# Patient Record
Sex: FEMALE | Race: WHITE | NOT HISPANIC OR LATINO | Employment: OTHER | URBAN - METROPOLITAN AREA
[De-identification: names, ages, dates, MRNs, and addresses within clinical notes are randomized per-mention and may not be internally consistent; named-entity substitution may affect disease eponyms.]

---

## 2023-04-17 LAB
ALANINE AMINOTRANSFERASE (SGPT) (U/L) IN SER/PLAS: 7 U/L (ref 7–45)
ALBUMIN (G/DL) IN SER/PLAS: 4.6 G/DL (ref 3.4–5)
ALKALINE PHOSPHATASE (U/L) IN SER/PLAS: 61 U/L (ref 33–136)
ANION GAP IN SER/PLAS: 12 MMOL/L (ref 10–20)
ASPARTATE AMINOTRANSFERASE (SGOT) (U/L) IN SER/PLAS: 13 U/L (ref 9–39)
BILIRUBIN TOTAL (MG/DL) IN SER/PLAS: 0.7 MG/DL (ref 0–1.2)
CALCIUM (MG/DL) IN SER/PLAS: 9.6 MG/DL (ref 8.6–10.6)
CARBON DIOXIDE, TOTAL (MMOL/L) IN SER/PLAS: 30 MMOL/L (ref 21–32)
CHLORIDE (MMOL/L) IN SER/PLAS: 104 MMOL/L (ref 98–107)
COBALAMIN (VITAMIN B12) (PG/ML) IN SER/PLAS: 527 PG/ML (ref 211–911)
CREATININE (MG/DL) IN SER/PLAS: 0.84 MG/DL (ref 0.5–1.05)
ERYTHROCYTE DISTRIBUTION WIDTH (RATIO) BY AUTOMATED COUNT: 12.9 % (ref 11.5–14.5)
ERYTHROCYTE MEAN CORPUSCULAR HEMOGLOBIN CONCENTRATION (G/DL) BY AUTOMATED: 32 G/DL (ref 32–36)
ERYTHROCYTE MEAN CORPUSCULAR VOLUME (FL) BY AUTOMATED COUNT: 92 FL (ref 80–100)
ERYTHROCYTES (10*6/UL) IN BLOOD BY AUTOMATED COUNT: 4.79 X10E12/L (ref 4–5.2)
GFR FEMALE: 74 ML/MIN/1.73M2
GLUCOSE (MG/DL) IN SER/PLAS: 97 MG/DL (ref 74–99)
HEMATOCRIT (%) IN BLOOD BY AUTOMATED COUNT: 44.1 % (ref 36–46)
HEMOGLOBIN (G/DL) IN BLOOD: 14.1 G/DL (ref 12–16)
LEUKOCYTES (10*3/UL) IN BLOOD BY AUTOMATED COUNT: 4.7 X10E9/L (ref 4.4–11.3)
NRBC (PER 100 WBCS) BY AUTOMATED COUNT: 0 /100 WBC (ref 0–0)
PLATELETS (10*3/UL) IN BLOOD AUTOMATED COUNT: 230 X10E9/L (ref 150–450)
POTASSIUM (MMOL/L) IN SER/PLAS: 4 MMOL/L (ref 3.5–5.3)
PROTEIN TOTAL: 6.9 G/DL (ref 6.4–8.2)
SODIUM (MMOL/L) IN SER/PLAS: 142 MMOL/L (ref 136–145)
THYROTROPIN (MIU/L) IN SER/PLAS BY DETECTION LIMIT <= 0.05 MIU/L: 2.34 MIU/L (ref 0.44–3.98)
UREA NITROGEN (MG/DL) IN SER/PLAS: 17 MG/DL (ref 6–23)

## 2023-10-18 ENCOUNTER — LAB (OUTPATIENT)
Dept: LAB | Facility: LAB | Age: 73
End: 2023-10-18
Payer: MEDICARE

## 2023-10-18 DIAGNOSIS — I10 ESSENTIAL (PRIMARY) HYPERTENSION: Primary | ICD-10-CM

## 2023-10-18 LAB
ANION GAP SERPL CALC-SCNC: 15 MMOL/L (ref 10–20)
BUN SERPL-MCNC: 22 MG/DL (ref 6–23)
CALCIUM SERPL-MCNC: 9.6 MG/DL (ref 8.6–10.6)
CHLORIDE SERPL-SCNC: 104 MMOL/L (ref 98–107)
CO2 SERPL-SCNC: 27 MMOL/L (ref 21–32)
CREAT SERPL-MCNC: 0.89 MG/DL (ref 0.5–1.05)
ERYTHROCYTE [DISTWIDTH] IN BLOOD BY AUTOMATED COUNT: 13.1 % (ref 11.5–14.5)
GFR SERPL CREATININE-BSD FRML MDRD: 69 ML/MIN/1.73M*2
GLUCOSE SERPL-MCNC: 102 MG/DL (ref 74–99)
HCT VFR BLD AUTO: 42.7 % (ref 36–46)
HGB BLD-MCNC: 13.5 G/DL (ref 12–16)
MCH RBC QN AUTO: 30.5 PG (ref 26–34)
MCHC RBC AUTO-ENTMCNC: 31.6 G/DL (ref 32–36)
MCV RBC AUTO: 97 FL (ref 80–100)
NRBC BLD-RTO: 0 /100 WBCS (ref 0–0)
PLATELET # BLD AUTO: 209 X10*3/UL (ref 150–450)
PMV BLD AUTO: 10.8 FL (ref 7.5–11.5)
POTASSIUM SERPL-SCNC: 4.2 MMOL/L (ref 3.5–5.3)
RBC # BLD AUTO: 4.42 X10*6/UL (ref 4–5.2)
SODIUM SERPL-SCNC: 142 MMOL/L (ref 136–145)
WBC # BLD AUTO: 4.4 X10*3/UL (ref 4.4–11.3)

## 2023-10-18 PROCEDURE — 36415 COLL VENOUS BLD VENIPUNCTURE: CPT

## 2023-10-18 PROCEDURE — 80048 BASIC METABOLIC PNL TOTAL CA: CPT

## 2023-10-18 PROCEDURE — 85027 COMPLETE CBC AUTOMATED: CPT

## 2023-11-01 PROBLEM — F32.A DEPRESSION: Status: ACTIVE | Noted: 2023-11-01

## 2023-11-01 PROBLEM — C50.811 MALIGNANT NEOPLASM OF OVERLAPPING SITES OF RIGHT FEMALE BREAST (MULTI): Status: ACTIVE | Noted: 2022-12-22

## 2023-11-01 PROBLEM — N60.19 FIBROCYSTIC CHANGE OF BREAST: Status: ACTIVE | Noted: 2023-11-01

## 2023-11-01 PROBLEM — D72.819 LEUKOPENIA: Status: ACTIVE | Noted: 2023-11-01

## 2023-11-01 PROBLEM — J34.89 RHINORRHEA: Status: ACTIVE | Noted: 2023-11-01

## 2023-11-01 PROBLEM — R01.1 MURMUR: Status: ACTIVE | Noted: 2023-11-01

## 2023-11-01 PROBLEM — I10 HIGH BLOOD PRESSURE: Status: ACTIVE | Noted: 2023-11-01

## 2023-11-01 RX ORDER — ACETAMINOPHEN, DEXTROMETHORPHAN HBR, DOXYLAMINE SUCCINATE, PHENYLEPHRINE HCL 650; 20; 12.5; 1 MG/30ML; MG/30ML; MG/30ML; MG/30ML
1 SOLUTION ORAL DAILY
COMMUNITY
Start: 2013-10-08

## 2023-11-01 RX ORDER — BENZONATATE 100 MG/1
1 CAPSULE ORAL 3 TIMES DAILY PRN
COMMUNITY
Start: 2022-03-16 | End: 2023-11-28 | Stop reason: DRUGHIGH

## 2023-11-01 RX ORDER — CITALOPRAM 20 MG/1
1 TABLET, FILM COATED ORAL DAILY
COMMUNITY
Start: 2014-04-25 | End: 2023-11-02 | Stop reason: SDUPTHER

## 2023-11-01 RX ORDER — METOPROLOL SUCCINATE 100 MG/1
1 TABLET, EXTENDED RELEASE ORAL DAILY
COMMUNITY
Start: 2013-05-31 | End: 2024-05-02 | Stop reason: SDUPTHER

## 2023-11-01 RX ORDER — AMLODIPINE BESYLATE 5 MG/1
1 TABLET ORAL DAILY
COMMUNITY
End: 2023-11-02 | Stop reason: SDUPTHER

## 2023-11-01 RX ORDER — IPRATROPIUM BROMIDE 42 UG/1
2 SPRAY, METERED NASAL 3 TIMES DAILY
COMMUNITY
Start: 2022-05-10 | End: 2023-11-28 | Stop reason: WASHOUT

## 2023-11-01 RX ORDER — ERGOCALCIFEROL 1.25 MG/1
1 CAPSULE ORAL 2 TIMES WEEKLY
COMMUNITY
Start: 2013-02-19 | End: 2023-11-28 | Stop reason: WASHOUT

## 2023-11-01 RX ORDER — LEVOTHYROXINE SODIUM 88 UG/1
1 TABLET ORAL DAILY
COMMUNITY
Start: 2013-05-31 | End: 2024-05-02 | Stop reason: SDUPTHER

## 2023-11-01 RX ORDER — ROSUVASTATIN CALCIUM 10 MG/1
1 TABLET, FILM COATED ORAL DAILY
COMMUNITY
Start: 2013-05-31 | End: 2023-11-02 | Stop reason: SDUPTHER

## 2023-11-02 ENCOUNTER — OFFICE VISIT (OUTPATIENT)
Dept: PRIMARY CARE | Facility: CLINIC | Age: 73
End: 2023-11-02
Payer: MEDICARE

## 2023-11-02 VITALS
TEMPERATURE: 97.9 F | SYSTOLIC BLOOD PRESSURE: 128 MMHG | BODY MASS INDEX: 24.49 KG/M2 | HEART RATE: 59 BPM | WEIGHT: 147 LBS | DIASTOLIC BLOOD PRESSURE: 78 MMHG | OXYGEN SATURATION: 99 % | HEIGHT: 65 IN

## 2023-11-02 DIAGNOSIS — I10 HYPERTENSION, UNSPECIFIED TYPE: ICD-10-CM

## 2023-11-02 DIAGNOSIS — E03.9 HYPOTHYROIDISM, UNSPECIFIED TYPE: ICD-10-CM

## 2023-11-02 DIAGNOSIS — E78.5 HYPERLIPIDEMIA, UNSPECIFIED HYPERLIPIDEMIA TYPE: ICD-10-CM

## 2023-11-02 DIAGNOSIS — F32.A DEPRESSION, UNSPECIFIED DEPRESSION TYPE: Primary | ICD-10-CM

## 2023-11-02 DIAGNOSIS — E55.9 VITAMIN D DEFICIENCY: ICD-10-CM

## 2023-11-02 DIAGNOSIS — E53.8 VITAMIN B12 DEFICIENCY: ICD-10-CM

## 2023-11-02 DIAGNOSIS — F41.1 ANXIETY STATE: ICD-10-CM

## 2023-11-02 PROBLEM — C50.811 MALIGNANT NEOPLASM OF OVERLAPPING SITES OF RIGHT FEMALE BREAST (MULTI): Status: RESOLVED | Noted: 2022-12-22 | Resolved: 2023-11-02

## 2023-11-02 PROBLEM — R01.1 MURMUR: Status: RESOLVED | Noted: 2023-11-01 | Resolved: 2023-11-02

## 2023-11-02 PROBLEM — J34.89 RHINORRHEA: Status: RESOLVED | Noted: 2023-11-01 | Resolved: 2023-11-02

## 2023-11-02 PROCEDURE — 3074F SYST BP LT 130 MM HG: CPT | Performed by: INTERNAL MEDICINE

## 2023-11-02 PROCEDURE — 3078F DIAST BP <80 MM HG: CPT | Performed by: INTERNAL MEDICINE

## 2023-11-02 PROCEDURE — 1126F AMNT PAIN NOTED NONE PRSNT: CPT | Performed by: INTERNAL MEDICINE

## 2023-11-02 PROCEDURE — 1159F MED LIST DOCD IN RCRD: CPT | Performed by: INTERNAL MEDICINE

## 2023-11-02 PROCEDURE — 99214 OFFICE O/P EST MOD 30 MIN: CPT | Performed by: INTERNAL MEDICINE

## 2023-11-02 PROCEDURE — 1036F TOBACCO NON-USER: CPT | Performed by: INTERNAL MEDICINE

## 2023-11-02 RX ORDER — AMLODIPINE BESYLATE 5 MG/1
5 TABLET ORAL DAILY
Qty: 90 TABLET | Refills: 3 | Status: SHIPPED | OUTPATIENT
Start: 2023-11-02

## 2023-11-02 RX ORDER — CITALOPRAM 20 MG/1
20 TABLET, FILM COATED ORAL DAILY
Qty: 90 TABLET | Refills: 3 | Status: SHIPPED | OUTPATIENT
Start: 2023-11-02

## 2023-11-02 RX ORDER — ROSUVASTATIN CALCIUM 10 MG/1
10 TABLET, FILM COATED ORAL DAILY
Qty: 90 TABLET | Refills: 3 | Status: SHIPPED | OUTPATIENT
Start: 2023-11-02 | End: 2023-11-03

## 2023-11-02 ASSESSMENT — PATIENT HEALTH QUESTIONNAIRE - PHQ9
SUM OF ALL RESPONSES TO PHQ9 QUESTIONS 1 & 2: 0
1. LITTLE INTEREST OR PLEASURE IN DOING THINGS: NOT AT ALL
2. FEELING DOWN, DEPRESSED OR HOPELESS: NOT AT ALL

## 2023-11-02 ASSESSMENT — PAIN SCALES - GENERAL: PAINLEVEL: 0-NO PAIN

## 2023-11-02 NOTE — PROGRESS NOTES
"Subjective   Patient ID: Anna Marie Bryant is a 73 y.o. female who presents for Follow-up (Pt here for 6 mo FUV.  Pt needs refills.).  Last 5/2023.   Patient denies chest pain, pressure, palpitations, or shortness of breath  No GI or  complaints     Past Medical History  Right breast cancer 1985 age 35 lumpectomy and radiation treatment.   Breast cancer diagnosed 2013, right mastectomy,  [facility].   Hypothyroidism TSH April 2023 normal  Hyperlipidemia  HTN  Anxiety  B12 deficiency  Remote pneumonia  Leukopenia, Dr Antunez (patient indicates stable and did not need follow up)  Oophorectomy 1976, ovarian cyst ruptured.      Social History  Former smoker quit age 31  . no children    Objective   /78 (BP Location: Left arm, Patient Position: Sitting)   Pulse 59   Temp 36.6 °C (97.9 °F)   Ht 1.651 m (5' 5\")   Wt 66.7 kg (147 lb)   SpO2 99%   BMI 24.46 kg/m²       Vital reviewed  Neck: no cervical/clavicular adenopathy  CV: RRR S1 S2 normal. No murmur  Lungs: CTA without wrr. Breath sounds symmetric  Abdomen: normoactive. Soft, nontender. No mass.  Extremities: no pretibial edema  Neuro: speech intact.   Skin no rash noted    Labs 10/203 bmp, cbc glucose 102 hg 13.5    4/2023 cbc, tsh, cmp, b12      Diagnoses and all orders for this visit:  Depression, unspecified depression type  -     citalopram (CeleXA) 20 mg tablet; Take 1 tablet (20 mg) by mouth once daily.  Anxiety state  -     citalopram (CeleXA) 20 mg tablet; Take 1 tablet (20 mg) by mouth once daily.  Hyperlipidemia, unspecified hyperlipidemia type  -     Crestor 10 mg tablet; Take 1 tablet (10 mg) by mouth once daily.  -     Comprehensive Metabolic Panel; Future  -     Lipid Panel; Future  Hypertension, unspecified type  -     amLODIPine (Norvasc) 5 mg tablet; Take 1 tablet (5 mg) by mouth once daily. as directed  -     CBC; Future  Hypothyroidism, unspecified type  -     TSH with reflex to Free T4 if abnormal; Future  Vitamin B12 " deficiency  -     Vitamin B12; Future  Vitamin D deficiency  -     Vitamin D 25-Hydroxy,Total (for eval of Vitamin D levels); Future     Mammogram 12/2022 s/p right mastectomy  Dexa 9/2022  Colonoscopy due 2027  Immunizations reviewed.

## 2023-11-03 ENCOUNTER — TELEPHONE (OUTPATIENT)
Dept: PRIMARY CARE | Facility: CLINIC | Age: 73
End: 2023-11-03
Payer: MEDICARE

## 2023-11-03 DIAGNOSIS — E78.5 HYPERLIPIDEMIA, UNSPECIFIED HYPERLIPIDEMIA TYPE: Primary | ICD-10-CM

## 2023-11-03 RX ORDER — ROSUVASTATIN CALCIUM 40 MG/1
40 TABLET, COATED ORAL DAILY
Qty: 90 TABLET | Refills: 3 | Status: SHIPPED | OUTPATIENT
Start: 2023-11-03 | End: 2023-11-07 | Stop reason: ENTERED-IN-ERROR

## 2023-11-03 NOTE — TELEPHONE ENCOUNTER
"Pt called and left message requesting Crestor to be resent as Rosuvastatin.  States \"There is almost a $600 difference so I would like to generic.\"  [facility]  "

## 2023-11-07 ENCOUNTER — TELEPHONE (OUTPATIENT)
Dept: PRIMARY CARE | Facility: CLINIC | Age: 73
End: 2023-11-07
Payer: MEDICARE

## 2023-11-07 DIAGNOSIS — E78.5 HYPERLIPIDEMIA, UNSPECIFIED HYPERLIPIDEMIA TYPE: Primary | ICD-10-CM

## 2023-11-07 RX ORDER — ROSUVASTATIN CALCIUM 10 MG/1
10 TABLET, COATED ORAL DAILY
Qty: 90 TABLET | Refills: 3 | Status: SHIPPED | OUTPATIENT
Start: 2023-11-07

## 2023-11-07 NOTE — TELEPHONE ENCOUNTER
"Pt called and left message \"I have two things today.  The first one, rosuvastatin 40 MG was sent to the pharmacy, but I take 10 MG.  I would like the Rosuvastatin 10 MG sent to [facility].  Also, I am having some laser dental work done and they want me to take an antibiotic prior.  They want me to take a z-pack because I am allergic to Amoxicillin, but the pharmacist would not fill that because he said that I have an allergy to Azithromycin.  Please advise me on what I can take.\"  "

## 2023-11-08 NOTE — TELEPHONE ENCOUNTER
"Called pt and informed, voiced understanding.  Pt states \"I have had breathing problems when I take the Erythromycin, so I will tell the dentist I cannot take that and that I have taken Doxycycline in the past.\"  "

## 2023-11-28 ENCOUNTER — OFFICE VISIT (OUTPATIENT)
Dept: PRIMARY CARE | Facility: CLINIC | Age: 73
End: 2023-11-28
Payer: MEDICARE

## 2023-11-28 VITALS
OXYGEN SATURATION: 96 % | HEART RATE: 83 BPM | BODY MASS INDEX: 24.49 KG/M2 | HEIGHT: 65 IN | WEIGHT: 147 LBS | TEMPERATURE: 98.1 F | DIASTOLIC BLOOD PRESSURE: 82 MMHG | SYSTOLIC BLOOD PRESSURE: 122 MMHG

## 2023-11-28 DIAGNOSIS — R06.2 WHEEZING: ICD-10-CM

## 2023-11-28 DIAGNOSIS — J40 BRONCHITIS: Primary | ICD-10-CM

## 2023-11-28 PROCEDURE — 3079F DIAST BP 80-89 MM HG: CPT | Performed by: INTERNAL MEDICINE

## 2023-11-28 PROCEDURE — 3074F SYST BP LT 130 MM HG: CPT | Performed by: INTERNAL MEDICINE

## 2023-11-28 PROCEDURE — 1160F RVW MEDS BY RX/DR IN RCRD: CPT | Performed by: INTERNAL MEDICINE

## 2023-11-28 PROCEDURE — 1159F MED LIST DOCD IN RCRD: CPT | Performed by: INTERNAL MEDICINE

## 2023-11-28 PROCEDURE — 1126F AMNT PAIN NOTED NONE PRSNT: CPT | Performed by: INTERNAL MEDICINE

## 2023-11-28 PROCEDURE — 99213 OFFICE O/P EST LOW 20 MIN: CPT | Performed by: INTERNAL MEDICINE

## 2023-11-28 PROCEDURE — 1036F TOBACCO NON-USER: CPT | Performed by: INTERNAL MEDICINE

## 2023-11-28 RX ORDER — ALBUTEROL SULFATE 90 UG/1
2 AEROSOL, METERED RESPIRATORY (INHALATION) EVERY 4 HOURS PRN
Qty: 8.5 G | Refills: 0 | Status: SHIPPED | OUTPATIENT
Start: 2023-11-28 | End: 2024-11-27

## 2023-11-28 RX ORDER — METHYLPREDNISOLONE 4 MG/1
TABLET ORAL
Qty: 21 TABLET | Refills: 0 | Status: SHIPPED | OUTPATIENT
Start: 2023-11-28 | End: 2024-05-02 | Stop reason: WASHOUT

## 2023-11-28 RX ORDER — DOXYCYCLINE 100 MG/1
100 CAPSULE ORAL 2 TIMES DAILY
Qty: 14 CAPSULE | Refills: 0 | Status: SHIPPED | OUTPATIENT
Start: 2023-11-28 | End: 2023-12-05

## 2023-11-28 RX ORDER — BENZONATATE 200 MG/1
200 CAPSULE ORAL 3 TIMES DAILY PRN
Qty: 60 CAPSULE | Refills: 0 | Status: SHIPPED | OUTPATIENT
Start: 2023-11-28

## 2023-11-28 ASSESSMENT — PATIENT HEALTH QUESTIONNAIRE - PHQ9
2. FEELING DOWN, DEPRESSED OR HOPELESS: NOT AT ALL
SUM OF ALL RESPONSES TO PHQ9 QUESTIONS 1 & 2: 0
1. LITTLE INTEREST OR PLEASURE IN DOING THINGS: NOT AT ALL

## 2023-11-28 ASSESSMENT — PAIN SCALES - GENERAL: PAINLEVEL: 0-NO PAIN

## 2023-11-28 NOTE — PROGRESS NOTES
"Chief Complaint   Patient presents with    URI     Pt here for c/o nasal congestion, post nasal drip and chest congestion.  Onset of symptoms 11/24/23   Accompanied by her .     Someone sneezed on her at a store about 4-5 days ago. Next day could tell was starting to develop something.  Doxycycine and benzonate 200 helped.   Nasal congestion, sneezing, then felt it going into chest. Felt congested everywhere.  Tried mucusrelief Dm -contains guaifenesin and dextromethorphan.  No fever. Not sob. Does wheeze if does not take mucous pill.   Had flu vaccine and covid vaccine. Last covid vaccine last month.    Past Medical History  Right breast cancer 1985 age 35 lumpectomy and radiation treatment.   Breast cancer diagnosed 2013, right mastectomy,  [facility].   Hypothyroidism TSH April 2023 normal  Hyperlipidemia  HTN  Anxiety  B12 deficiency  Remote pneumonia  Leukopenia, Dr Antunez (patient indicates stable and did not need follow up)  Oophorectomy 1976, ovarian cyst ruptured.      Social History  Former smoker quit age 31  . no children     Objective   Blood pressure 122/82, pulse 83, temperature 36.7 °C (98.1 °F), height 1.651 m (5' 5\"), weight 66.7 kg (147 lb), SpO2 96 %.  Body mass index is 24.46 kg/m².    Vital reviewed  Mask temporarily removed for exam.   Throat: no exudate.   Neck: no cervical/clavicular adenopathy  CV: RRR S1 S2 normal. No murmur  Lungs: scattered wheezing. Symmetric. No use of accessory muscles. No crackles  Extremities: no pretibial edema  Neuro: speech intact.      Labs 10/2023 bmp, cbc glucose 102 hg 13.5     Lab Results   Component Value Date    GLUCOSE 102 (H) 10/18/2023    CALCIUM 9.6 10/18/2023     10/18/2023    K 4.2 10/18/2023    CO2 27 10/18/2023     10/18/2023    BUN 22 10/18/2023    CREATININE 0.89 10/18/2023     Lab Results   Component Value Date    WBC 4.4 10/18/2023    HGB 13.5 10/18/2023    HCT 42.7 10/18/2023    MCV 97 10/18/2023    PLT " 209 10/18/2023       4/2023 cbc, tsh, cmp, b12    Anna Marie was seen today for uri.  Diagnoses and all orders for this visit:  Bronchitis (Primary)  -     dextromethorphan-guaifenesin (Mucinex DM)  mg 12 hr tablet; Take 1 tablet by mouth every 12 hours for 10 days. Do not crush, chew, or split.  -     benzonatate (Tessalon) 200 mg capsule; Take 1 capsule (200 mg) by mouth 3 times a day as needed for cough. Do not crush or chew.  -     doxycycline (Vibramycin) 100 mg capsule; Take 1 capsule (100 mg) by mouth 2 times a day for 7 days. Take with at least 8 ounces (large glass) of water, do not lie down for 30 minutes after  Wheezing  -     methylPREDNISolone (Medrol, [place],) 4 mg tablets; Follow schedule on package instructions  -     albuterol (ProAir HFA) 90 mcg/actuation inhaler; Inhale 2 puffs every 4 hours if needed for wheezing or shortness of breath.    Rtc if symptoms refractory.

## 2023-12-06 ENCOUNTER — ANCILLARY PROCEDURE (OUTPATIENT)
Dept: RADIOLOGY | Facility: CLINIC | Age: 73
End: 2023-12-06
Payer: MEDICARE

## 2023-12-06 DIAGNOSIS — Z85.3 PERSONAL HISTORY OF MALIGNANT NEOPLASM OF BREAST: ICD-10-CM

## 2023-12-06 PROCEDURE — 77065 DX MAMMO INCL CAD UNI: CPT | Mod: LT

## 2023-12-06 PROCEDURE — G0279 TOMOSYNTHESIS, MAMMO: HCPCS | Mod: LEFT SIDE | Performed by: RADIOLOGY

## 2023-12-06 PROCEDURE — 77065 DX MAMMO INCL CAD UNI: CPT | Mod: LEFT SIDE | Performed by: RADIOLOGY

## 2023-12-11 PROBLEM — Z85.3 HISTORY OF RIGHT BREAST CANCER: Status: ACTIVE | Noted: 2023-12-11

## 2023-12-11 NOTE — PROGRESS NOTES
"History Of Present Illness  HPI   The patient is a 73-year-old female who had a right breast lumpectomy and axillary lymph node dissection in 1985 followed by radiation therapy for breast cancer.  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  The patient has no breast or chest lumps.  No pain.  No nipple discharge or retraction.    Family history: Maternal great aunt had breast cancer.    Past medical history:  Laparoscopic bilateral oophorectomy  Hypertension    Past Medical History  She has a past medical history of Personal history of malignant neoplasm of breast (2013).    Surgical History  She has a past surgical history that includes Appendectomy (12/23/2015); Lymph node biopsy (12/23/2015); Cataract extraction (12/23/2015); Tonsillectomy (12/23/2015); Other surgical history (10/18/2022); Other surgical history (12/09/2022); Breast surgery (07/01/2020); Other surgical history (10/13/2021); Other surgical history (10/13/2021); and Mastectomy (Right, 2013).     Allergies  Amoxicillin, Erythromycin, and Paroxetine    Social History  She reports that she has quit smoking. Her smoking use included cigarettes. She has never used smokeless tobacco. No history on file for alcohol use and drug use.    Family History  Family History   Problem Relation Name Age of Onset    Hypertension Mother      Other (BLADDER CANCER) Mother      Other (CARDIAC DISORDER) Father      Hypertension Father      Hypertension Brother      Stroke Maternal Grandmother      Stroke Maternal Grandfather      Stroke Paternal Grandmother      Stroke Paternal Grandfather      Breast cancer Mother's Sister  30 - 39       Last Recorded Vitals  Blood pressure 136/78, pulse 74, temperature 36.1 °C (97 °F), temperature source Temporal, resp. rate 16, height 1.651 m (5' 5\"), weight 66 kg (145 lb 9.6 oz), SpO2 91 %.    Physical Exam  Constitutional: " Well-developed, well-nourished, alert and oriented, no acute distress  Skin: Warm and dry, no lesions, no rashes, no jaundice  HEENT: Normocephalic, atraumatic, EOMI, no scleral icterus, mucous membranes moist, no lesions seen  Neck: Soft, nontender, no mass or adenopathy, no JVD  Cardiac: Regular rate and rhythm, no murmur  Chest: Patent airway, clear to auscultation, normal breath sounds with good chest expansion, no wheezes or rales or rhonchi noted, thorax symmetric  Breast:     right breast: Status post right mastectomy and radiation therapy.  No other skin changes.  Nontender, no mass.    left breast: No skin changes, nontender, no mass, mild fibrocystic change  Abdomen: Nondistended, soft, nontender, no mass  Rectal: Not performed  Extremities: No injury, no lower extremity edema or calf tenderness  Lymphatic: No cervical or axillary adenopathy  Musculoskeletal: Range of motion intact, no joint swelling, normal strength  Neurological: Alert and oriented x3, intact sensory and motor function, no obvious focal neurologic abnormalities  Psychological: Appropriate mood and behavior  Examination chaperoned by Belen Adorno    Relevant Results  I reviewed the left breast mammogram report and images from December 6, 2023:  IMPRESSION:  No mammographic evidence of malignancy.  BI-RADS Category:  2 Benign.  Recommendation:  Routine Screening Mammogram in 1 Year.  Recommended Date:  1 Year.  Laterality:  Bilateral.     Assessment/Plan   Diagnoses and all orders for this visit:  Fibrocystic changes of left breast  History of right breast cancer  No evidence of recurrence on examination.  Left breast mammogram BI-RADS 2.  Repeat left mammogram in 1 year.  Breast self-exam monthly.  The patient wishes to continue every 6-month examinations due to her history of 2 episodes of right breast cancer.  She no longer follows with oncology.       Juwan Ochoa MD

## 2023-12-13 ENCOUNTER — OFFICE VISIT (OUTPATIENT)
Dept: SURGERY | Facility: CLINIC | Age: 73
End: 2023-12-13
Payer: MEDICARE

## 2023-12-13 VITALS
WEIGHT: 145.6 LBS | TEMPERATURE: 97 F | OXYGEN SATURATION: 91 % | SYSTOLIC BLOOD PRESSURE: 136 MMHG | HEIGHT: 65 IN | DIASTOLIC BLOOD PRESSURE: 78 MMHG | HEART RATE: 74 BPM | BODY MASS INDEX: 24.26 KG/M2 | RESPIRATION RATE: 16 BRPM

## 2023-12-13 DIAGNOSIS — Z85.3 HISTORY OF RIGHT BREAST CANCER: ICD-10-CM

## 2023-12-13 DIAGNOSIS — N60.12 FIBROCYSTIC CHANGES OF LEFT BREAST: Primary | ICD-10-CM

## 2023-12-13 PROCEDURE — 1159F MED LIST DOCD IN RCRD: CPT | Performed by: SURGERY

## 2023-12-13 PROCEDURE — 3075F SYST BP GE 130 - 139MM HG: CPT | Performed by: SURGERY

## 2023-12-13 PROCEDURE — 1126F AMNT PAIN NOTED NONE PRSNT: CPT | Performed by: SURGERY

## 2023-12-13 PROCEDURE — 1036F TOBACCO NON-USER: CPT | Performed by: SURGERY

## 2023-12-13 PROCEDURE — 99213 OFFICE O/P EST LOW 20 MIN: CPT | Performed by: SURGERY

## 2023-12-13 PROCEDURE — 1160F RVW MEDS BY RX/DR IN RCRD: CPT | Performed by: SURGERY

## 2023-12-13 PROCEDURE — 3078F DIAST BP <80 MM HG: CPT | Performed by: SURGERY

## 2023-12-13 NOTE — LETTER
December 13, 2023     Liyah Valenzuela MD  [id number] [facility] [id number]    Patient: Anna Marie Bryant   YOB: 1950   Date of Visit: 12/13/2023       Dear Dr. Liyah Valenzuela MD:    Thank you for referring Anna Marie Bryant to me for evaluation. Below are my notes for this consultation.  If you have questions, please do not hesitate to call me. I look forward to following your patient along with you.       Sincerely,     Juwan Ochoa MD      CC: No Recipients  ______________________________________________________________________________________    History Of Present Illness  HPI   The patient is a 73-year-old female who had a right breast lumpectomy and axillary lymph node dissection in 1985 followed by radiation therapy for breast cancer.  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  The patient has no breast or chest lumps.  No pain.  No nipple discharge or retraction.    Family history: Maternal great aunt had breast cancer.    Past medical history:  Laparoscopic bilateral oophorectomy  Hypertension    Past Medical History  She has a past medical history of Personal history of malignant neoplasm of breast (2013).    Surgical History  She has a past surgical history that includes Appendectomy (12/23/2015); Lymph node biopsy (12/23/2015); Cataract extraction (12/23/2015); Tonsillectomy (12/23/2015); Other surgical history (10/18/2022); Other surgical history (12/09/2022); Breast surgery (07/01/2020); Other surgical history (10/13/2021); Other surgical history (10/13/2021); and Mastectomy (Right, 2013).     Allergies  Amoxicillin, Erythromycin, and Paroxetine    Social History  She reports that she has quit smoking. Her smoking use included cigarettes. She has never used smokeless tobacco. No history on file for alcohol use and drug use.    Family History  Family History   Problem Relation Name Age of  "Onset   • Hypertension Mother     • Other (BLADDER CANCER) Mother     • Other (CARDIAC DISORDER) Father     • Hypertension Father     • Hypertension Brother     • Stroke Maternal Grandmother     • Stroke Maternal Grandfather     • Stroke Paternal Grandmother     • Stroke Paternal Grandfather     • Breast cancer Mother's Sister  30 - 39       Last Recorded Vitals  Blood pressure 136/78, pulse 74, temperature 36.1 °C (97 °F), temperature source Temporal, resp. rate 16, height 1.651 m (5' 5\"), weight 66 kg (145 lb 9.6 oz), SpO2 91 %.    Physical Exam  Constitutional: Well-developed, well-nourished, alert and oriented, no acute distress  Skin: Warm and dry, no lesions, no rashes, no jaundice  HEENT: Normocephalic, atraumatic, EOMI, no scleral icterus, mucous membranes moist, no lesions seen  Neck: Soft, nontender, no mass or adenopathy, no JVD  Cardiac: Regular rate and rhythm, no murmur  Chest: Patent airway, clear to auscultation, normal breath sounds with good chest expansion, no wheezes or rales or rhonchi noted, thorax symmetric  Breast:     right breast: Status post right mastectomy and radiation therapy.  No other skin changes.  Nontender, no mass.    left breast: No skin changes, nontender, no mass, mild fibrocystic change  Abdomen: Nondistended, soft, nontender, no mass  Rectal: Not performed  Extremities: No injury, no lower extremity edema or calf tenderness  Lymphatic: No cervical or axillary adenopathy  Musculoskeletal: Range of motion intact, no joint swelling, normal strength  Neurological: Alert and oriented x3, intact sensory and motor function, no obvious focal neurologic abnormalities  Psychological: Appropriate mood and behavior  Examination chaperoned by Belen Adorno    Relevant Results  I reviewed the left breast mammogram report and images from December 6, 2023:  IMPRESSION:  No mammographic evidence of malignancy.  BI-RADS Category:  2 Benign.  Recommendation:  Routine Screening Mammogram in 1 " Year.  Recommended Date:  1 Year.  Laterality:  Bilateral.     Assessment/Plan  Diagnoses and all orders for this visit:  Fibrocystic changes of left breast  History of right breast cancer  No evidence of recurrence on examination.  Left breast mammogram BI-RADS 2.  Repeat left mammogram in 1 year.  Breast self-exam monthly.  The patient wishes to continue every 6-month examinations due to her history of 2 episodes of right breast cancer.  She no longer follows with oncology.       Juwan Ochoa MD

## 2024-04-19 LAB
NON-UH HIE A/G RATIO: 1.4
NON-UH HIE ALB: 3.8 G/DL (ref 3.4–5)
NON-UH HIE ALK PHOS: 73 UNIT/L (ref 45–117)
NON-UH HIE BILIRUBIN, TOTAL: 0.6 MG/DL (ref 0.3–1.2)
NON-UH HIE BUN/CREAT RATIO: 21.1
NON-UH HIE BUN: 19 MG/DL (ref 9–23)
NON-UH HIE CALCIUM: 9.4 MG/DL (ref 8.7–10.4)
NON-UH HIE CALCULATED LDL CHOLESTEROL: 79 MG/DL (ref 60–130)
NON-UH HIE CALCULATED OSMOLALITY: 281 MOSM/KG (ref 275–295)
NON-UH HIE CHLORIDE: 106 MMOL/L (ref 98–107)
NON-UH HIE CHOLESTEROL: 161 MG/DL (ref 100–200)
NON-UH HIE CO2, VENOUS: 29 MMOL/L (ref 20–31)
NON-UH HIE CREATININE: 0.9 MG/DL (ref 0.5–0.8)
NON-UH HIE GFR AA: >60
NON-UH HIE GLOBULIN: 2.7 G/DL
NON-UH HIE GLOMERULAR FILTRATION RATE: >60 ML/MIN/1.73M?
NON-UH HIE GLUCOSE: 91 MG/DL (ref 74–106)
NON-UH HIE GOT: 16 UNIT/L (ref 15–37)
NON-UH HIE GPT: 10 UNIT/L (ref 10–49)
NON-UH HIE HCT: 38.9 % (ref 36–46)
NON-UH HIE HDL CHOLESTEROL: 64 MG/DL (ref 40–60)
NON-UH HIE HGB: 13.3 G/DL (ref 12–16)
NON-UH HIE INSTR WBC ND: 4.4
NON-UH HIE K: 4.2 MMOL/L (ref 3.5–5.1)
NON-UH HIE MCH: 30.1 PG (ref 27–34)
NON-UH HIE MCHC: 34.1 G/DL (ref 32–37)
NON-UH HIE MCV: 88.3 FL (ref 80–100)
NON-UH HIE MPV: 8.4 FL (ref 7.4–10.4)
NON-UH HIE NA: 140 MMOL/L (ref 135–145)
NON-UH HIE PLATELET: 191 X10 (ref 150–450)
NON-UH HIE RBC: 4.4 X10 (ref 4.2–5.4)
NON-UH HIE RDW: 13.4 % (ref 11.5–14.5)
NON-UH HIE TOTAL CHOL/HDL CHOL RATIO: 2.5
NON-UH HIE TOTAL PROTEIN: 6.5 G/DL (ref 5.7–8.2)
NON-UH HIE TRIGLYCERIDES: 91 MG/DL (ref 30–150)
NON-UH HIE TSH: 2.29 UIU/ML (ref 0.55–4.78)
NON-UH HIE VIT D 25: 24 NG/ML
NON-UH HIE VITAMIN B12: 682 PG/ML (ref 211–911)
NON-UH HIE WBC: 4.4 X10 (ref 4.5–11)

## 2024-05-02 ENCOUNTER — OFFICE VISIT (OUTPATIENT)
Dept: PRIMARY CARE | Facility: CLINIC | Age: 74
End: 2024-05-02
Payer: MEDICARE

## 2024-05-02 VITALS
BODY MASS INDEX: 25.44 KG/M2 | SYSTOLIC BLOOD PRESSURE: 128 MMHG | HEART RATE: 64 BPM | DIASTOLIC BLOOD PRESSURE: 70 MMHG | HEIGHT: 64 IN | WEIGHT: 149 LBS | TEMPERATURE: 97.9 F

## 2024-05-02 DIAGNOSIS — Z00.00 ROUTINE GENERAL MEDICAL EXAMINATION AT HEALTH CARE FACILITY: ICD-10-CM

## 2024-05-02 DIAGNOSIS — E78.5 HYPERLIPIDEMIA, UNSPECIFIED HYPERLIPIDEMIA TYPE: ICD-10-CM

## 2024-05-02 DIAGNOSIS — Z11.59 NEED FOR HEPATITIS C SCREENING TEST: ICD-10-CM

## 2024-05-02 DIAGNOSIS — E55.9 VITAMIN D DEFICIENCY: Primary | ICD-10-CM

## 2024-05-02 DIAGNOSIS — I10 HYPERTENSION, UNSPECIFIED TYPE: ICD-10-CM

## 2024-05-02 DIAGNOSIS — E03.9 HYPOTHYROIDISM, UNSPECIFIED TYPE: ICD-10-CM

## 2024-05-02 PROCEDURE — 1159F MED LIST DOCD IN RCRD: CPT | Performed by: INTERNAL MEDICINE

## 2024-05-02 PROCEDURE — 3078F DIAST BP <80 MM HG: CPT | Performed by: INTERNAL MEDICINE

## 2024-05-02 PROCEDURE — 1170F FXNL STATUS ASSESSED: CPT | Performed by: INTERNAL MEDICINE

## 2024-05-02 PROCEDURE — 1157F ADVNC CARE PLAN IN RCRD: CPT | Performed by: INTERNAL MEDICINE

## 2024-05-02 PROCEDURE — 99214 OFFICE O/P EST MOD 30 MIN: CPT | Performed by: INTERNAL MEDICINE

## 2024-05-02 PROCEDURE — G0439 PPPS, SUBSEQ VISIT: HCPCS | Performed by: INTERNAL MEDICINE

## 2024-05-02 PROCEDURE — 1036F TOBACCO NON-USER: CPT | Performed by: INTERNAL MEDICINE

## 2024-05-02 PROCEDURE — 3074F SYST BP LT 130 MM HG: CPT | Performed by: INTERNAL MEDICINE

## 2024-05-02 RX ORDER — CHOLECALCIFEROL (VITAMIN D3) 1250 MCG
TABLET ORAL
Qty: 8 TABLET | Refills: 0 | Status: SHIPPED | OUTPATIENT
Start: 2024-05-02

## 2024-05-02 RX ORDER — LEVOTHYROXINE SODIUM 88 UG/1
88 TABLET ORAL DAILY
Qty: 90 TABLET | Refills: 3 | Status: SHIPPED | OUTPATIENT
Start: 2024-05-02

## 2024-05-02 RX ORDER — METOPROLOL SUCCINATE 100 MG/1
100 TABLET, EXTENDED RELEASE ORAL DAILY
Qty: 90 TABLET | Refills: 3 | Status: SHIPPED | OUTPATIENT
Start: 2024-05-02

## 2024-05-02 ASSESSMENT — ACTIVITIES OF DAILY LIVING (ADL)
TAKING_MEDICATION: INDEPENDENT
MANAGING_FINANCES: INDEPENDENT
DOING_HOUSEWORK: INDEPENDENT
DRESSING: INDEPENDENT
GROCERY_SHOPPING: INDEPENDENT
BATHING: INDEPENDENT

## 2024-05-02 ASSESSMENT — ENCOUNTER SYMPTOMS
OCCASIONAL FEELINGS OF UNSTEADINESS: 0
LOSS OF SENSATION IN FEET: 0
DEPRESSION: 0

## 2024-05-02 ASSESSMENT — PATIENT HEALTH QUESTIONNAIRE - PHQ9
2. FEELING DOWN, DEPRESSED OR HOPELESS: NOT AT ALL
1. LITTLE INTEREST OR PLEASURE IN DOING THINGS: NOT AT ALL
SUM OF ALL RESPONSES TO PHQ9 QUESTIONS 1 AND 2: 0

## 2024-05-02 NOTE — ASSESSMENT & PLAN NOTE
ight side both times, had right mastectomy  Diagnosed 1985 (age 35) lumpectomy and radiation treatment  2013 right mastrectomy, Dr Ochoa, [facility]

## 2024-05-02 NOTE — PROGRESS NOTES
"Chief Complaint   Patient presents with    Medicare Annual Wellness Visit Subsequent       74 y.o. for AWV and 6 month follow up.   Last visit 11/2023.  Accompanied by her .  She was noticed to lump on the left thigh she is not sure if it was a lump or a vein.  She wanted to make sure it was not cancer given her history of right breast cancer.  She is up-to-date with mammograms.  She does check her breasts.  No concerns there.  Very active with social groups.  They like to do small outings during the summer.  She has a lot of things to look forward to.  Will be back to her class reunion     Past Medical History  Right breast cancer 1985 age 35 lumpectomy and radiation treatment.   Breast cancer diagnosed 2013, right mastectomy,  [facility].   Hypothyroidism  Hyperlipidemia  HTN  Anxiety  B12 deficiency  Remote pneumonia  Leukopenia, Dr Antunez (patient indicates stable and did not need follow up)  Oophorectomy 1976, ovarian cyst ruptured.      Social History  Former smoker quit age 31  . no children    Blood pressure 128/70, pulse 64, temperature 36.6 °C (97.9 °F), height 1.619 m (5' 3.75\"), weight 67.6 kg (149 lb).  Body mass index is 25.78 kg/m².    Physical Examination  General: NAD. Alert.   HEENT: Normocephalic atraumatic.    Eyes: no scleral icterus. Extraocular movements intact.  Pupils equal round and reactive to light.  Ears: TM intact.  No cerumen. Hearing grossly intact.   Throat: No exudate  Neck:  Supple. No thyroid goiter.  Lymph nodes: No cervical or clavicular adenopathy  Cardiovascular: Regular rate rhythm S1-S2 normal no murmur. No carotid bruit.   Lungs: Clear to Auscultation without wheezing, rales, or rhonchi, Breath sounds symmetric. No use of accessory muscles  Abdomen:  Normoactive bowel sounds, soft, non-tender. No mass.  No organomegaly  Extremities: No pretibial edema  Neuro: no facial asymmetry. Strength upper and lower extremities 5/5. Sensation intact to light " "touch. No tremor. Babinski negative  Skin: no rash noted.  Very small nodule lower left lateral thigh.  Mobile.  Vascular: DP pulses intact.  No cyanosis.  Breasts: Status post right mastectomy.  Left breast without mass, nipple discharge, or axillary adenopathy    Labs April 2024 TSH, vitamin D, lipid, B12, CMP, CBC  TSH 2.29 B12 682 vitamin D low at 24  Cholesterol 161 HDL 64 LDL 79 triglycerides 91  Creatinine 0.9 glucose 91 liver function test negative  White blood cell count slightly low at 4.4 hemoglobin 13.3 platelet 391    No results found for: \"HGBA1C\"  Lab Results   Component Value Date    GLUCOSE 102 (H) 10/18/2023    CALCIUM 9.6 10/18/2023     10/18/2023    K 4.2 10/18/2023    CO2 27 10/18/2023     10/18/2023    BUN 22 10/18/2023    CREATININE 0.89 10/18/2023     Lab Results   Component Value Date    ALT 7 04/17/2023    AST 13 04/17/2023    ALKPHOS 61 04/17/2023    BILITOT 0.7 04/17/2023     Lab Results   Component Value Date    WBC 4.4 10/18/2023    HGB 13.5 10/18/2023    HCT 42.7 10/18/2023    MCV 97 10/18/2023     10/18/2023     Lab Results   Component Value Date    CHOL 131 10/06/2022     Lab Results   Component Value Date    HDL 58.7 10/06/2022     No results found for: \"LDLCALC\"  Lab Results   Component Value Date    TRIG 79 10/06/2022     ASSESSMENT/PLAN  AWV  Living Will: has a living will.   Cognition/Memory if 65 or above 3/3 recall   Hepatitis C (18-79) ordered.   HIV (18-64, once) n/a  Women: mammogram: Left diagnostic December 2023 Dr Ochoa  Dexa: September 2022  Colon cancer screening 45 and older: Colonoscopy August 2020 2 repeat due 2027 Dr Morrison  Immunization: recommend shingles vaccine.  Depression: denies.    1. Vitamin D deficiency  - cholecalciferol (Vitamin D3) 1,250 mcg (50,000 unit) tablet; 1 po a week for 8 weeks. After completing take otc vitamin D 2000 units a day.  Dispense: 8 tablet; Refill: 0  - Vitamin D 25-Hydroxy,Total (for eval of Vitamin D levels); " Future    2. Need for hepatitis C screening test  - Hepatitis C antibody; Future    3. Routine general medical examination at health care facility    4. Hyperlipidemia, unspecified hyperlipidemia type.  HDL and LDL within goal  - Comprehensive Metabolic Panel; Future    5. Hypertension, unspecified type  - CBC and Auto Differential; Future  - Comprehensive Metabolic Panel; Future  - metoprolol succinate XL (Toprol-XL) 100 mg 24 hr tablet; Take 1 tablet (100 mg) by mouth once daily.  Dispense: 90 tablet; Refill: 3.  Continue amlodipine.    6. Hypothyroidism, unspecified type  Tsh within goal.  - levothyroxine (Synthroid, Levoxyl) 88 mcg tablet; Take 1 tablet (88 mcg) by mouth once daily.  Dispense: 90 tablet; Refill: 3    Plan follow-up in 6 months and as needed    Current Outpatient Medications on File Prior to Visit   Medication Sig Dispense Refill    albuterol (ProAir HFA) 90 mcg/actuation inhaler Inhale 2 puffs every 4 hours if needed for wheezing or shortness of breath. 8.5 g 0    amLODIPine (Norvasc) 5 mg tablet Take 1 tablet (5 mg) by mouth once daily. as directed 90 tablet 3    benzonatate (Tessalon) 200 mg capsule Take 1 capsule (200 mg) by mouth 3 times a day as needed for cough. Do not crush or chew. 60 capsule 0    citalopram (CeleXA) 20 mg tablet Take 1 tablet (20 mg) by mouth once daily. 90 tablet 3    cyanocobalamin, vitamin B-12, (Vitamin B-12) 1,000 mcg tablet extended release Take 1 tablet (1,000 mcg) by mouth once daily.      levothyroxine (Synthroid, Levoxyl) 88 mcg tablet Take 1 tablet (88 mcg) by mouth once daily.      metoprolol succinate XL (Toprol-XL) 100 mg 24 hr tablet Take 1 tablet (100 mg) by mouth once daily.      rosuvastatin (Crestor) 10 mg tablet Take 1 tablet (10 mg) by mouth once daily. 90 tablet 3    methylPREDNISolone (Medrol, [place],) 4 mg tablets Follow schedule on package instructions (Patient not taking: Reported on 5/2/2024) 21 tablet 0     No current facility-administered  medications on file prior to visit.

## 2024-05-28 NOTE — PROGRESS NOTES
History Of Present Illness  HPI    The patient is a 74-year-old female who had a right breast lumpectomy and axillary lymph node dissection in 1985 followed by radiation therapy for breast cancer.  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  The patient has no breast or chest lumps.  No pain.  No nipple discharge or retraction.  She complains of a left thigh nodule which she noticed a few months ago.  The nodule has not changed.  She has no pain in the area.  She cannot recall any injury.  No prior similar lumps.     Family history: Maternal great aunt had breast cancer.     Past medical history:  Laparoscopic bilateral oophorectomy  Hypertension    Past Medical History  She has a past medical history of Personal history of malignant neoplasm of breast (2013).    Surgical History  She has a past surgical history that includes Appendectomy (12/23/2015); Lymph node biopsy (12/23/2015); Cataract extraction (12/23/2015); Tonsillectomy (12/23/2015); Other surgical history (10/18/2022); Other surgical history (12/09/2022); Breast surgery (07/01/2020); Other surgical history (10/13/2021); Other surgical history (10/13/2021); and Mastectomy (Right, 2013).     Allergies  Amoxicillin, Erythromycin, and Paroxetine    Social History  She reports that she quit smoking about 39 years ago. Her smoking use included cigarettes. She started smoking about 54 years ago. She has a 7.5 pack-year smoking history. She has never used smokeless tobacco. She reports current alcohol use. She reports that she does not use drugs.    Family History  Family History   Problem Relation Name Age of Onset    Hypertension Mother      Other (BLADDER CANCER) Mother      Other (CARDIAC DISORDER) Father      Hypertension Father      Hypertension Brother      Stroke Maternal Grandmother      Stroke Maternal Grandfather      Stroke Paternal Grandmother       "Stroke Paternal Grandfather      Breast cancer Mother's Sister  30 - 39     Last Recorded Vitals  Blood pressure 139/73, pulse 72, temperature 36.9 °C (98.4 °F), temperature source Temporal, resp. rate 16, height 1.626 m (5' 4\"), weight 68.2 kg (150 lb 6.4 oz), SpO2 93%.    Physical Exam  Constitutional: Well-developed, well-nourished, alert and oriented, no acute distress  Skin: Warm and dry, no lesions, no rashes, no jaundice  HEENT: Normocephalic, atraumatic, EOMI, no scleral icterus, mucous membranes moist, no lesions seen  Neck: Soft, nontender, no mass or adenopathy  Cardiac: Regular rate and rhythm, no murmur  Chest: Patent airway, clear to auscultation, normal breath sounds with good chest expansion, no wheezes or rales or rhonchi noted, thorax symmetric  Breast:     right breast: Status post mastectomy.  No other skin changes, nontender, no mass.    left breast: No skin changes, nontender, no mass, mild fibrocystic change  Abdomen: Nondistended, soft, nontender, no mass  Rectal: Not performed  Extremities: No injury, no lower extremity edema or calf tenderness.  Left anterior lateral distal thigh about 10 cm superior to the patella, 0.8 cm diameter subcutaneous nodule.  Nontender.  No skin changes.  Lymphatic: No cervical or axillary adenopathy  Musculoskeletal: Range of motion intact, no joint swelling, normal strength  Neurological: Alert and oriented x3, intact sensory and motor function, no obvious focal neurologic abnormalities  Psychological: Appropriate mood and behavior  Examination chaperoned by Maisha Shaffer    Relevant Results       Assessment/Plan   Diagnoses and all orders for this visit:  Subcutaneous nodule of left lower extremity  -     US extremity nonvascular real time w image documentation limited anatomic specific; Future  Screening mammogram for breast cancer  -     BI mammo left screening tomosynthesis; Future  History of right breast cancer      No evidence of breast cancer recurrence on " examination.  Repeat left mammogram December 7, 2024.  Follow-up after mammogram completed.  Breast self-exam monthly.  The patient wishes to continue every 6-month examinations due to her history of 2 episodes of right breast cancer.  She no longer follows with oncology.  Left thigh subcutaneous nodule.  Order ultrasound for further evaluation.  Follow-up in 1 month.       Juwan Ochoa MD

## 2024-05-29 ENCOUNTER — OFFICE VISIT (OUTPATIENT)
Dept: SURGERY | Facility: CLINIC | Age: 74
End: 2024-05-29
Payer: MEDICARE

## 2024-05-29 VITALS
OXYGEN SATURATION: 93 % | WEIGHT: 150.4 LBS | BODY MASS INDEX: 25.68 KG/M2 | RESPIRATION RATE: 16 BRPM | DIASTOLIC BLOOD PRESSURE: 73 MMHG | HEIGHT: 64 IN | TEMPERATURE: 98.4 F | SYSTOLIC BLOOD PRESSURE: 139 MMHG | HEART RATE: 72 BPM

## 2024-05-29 DIAGNOSIS — Z85.3 HISTORY OF RIGHT BREAST CANCER: ICD-10-CM

## 2024-05-29 DIAGNOSIS — R22.42 SUBCUTANEOUS NODULE OF LEFT LOWER EXTREMITY: Primary | ICD-10-CM

## 2024-05-29 DIAGNOSIS — Z12.31 SCREENING MAMMOGRAM FOR BREAST CANCER: ICD-10-CM

## 2024-05-29 PROCEDURE — 1126F AMNT PAIN NOTED NONE PRSNT: CPT | Performed by: SURGERY

## 2024-05-29 PROCEDURE — 1157F ADVNC CARE PLAN IN RCRD: CPT | Performed by: SURGERY

## 2024-05-29 PROCEDURE — 3078F DIAST BP <80 MM HG: CPT | Performed by: SURGERY

## 2024-05-29 PROCEDURE — 3075F SYST BP GE 130 - 139MM HG: CPT | Performed by: SURGERY

## 2024-05-29 PROCEDURE — 99214 OFFICE O/P EST MOD 30 MIN: CPT | Performed by: SURGERY

## 2024-05-29 PROCEDURE — 1159F MED LIST DOCD IN RCRD: CPT | Performed by: SURGERY

## 2024-05-29 PROCEDURE — 1036F TOBACCO NON-USER: CPT | Performed by: SURGERY

## 2024-05-29 ASSESSMENT — PAIN SCALES - GENERAL: PAINLEVEL: 0-NO PAIN

## 2024-05-29 NOTE — LETTER
May 29, 2024     Liyah Valenzuela MD  [address] [facility] [address]    Patient: Anna Marie Bryant   YOB: 1950   Date of Visit: 5/29/2024       Dear Dr. Liyah Valenzuela MD:    Thank you for referring Anna Marie Bryant to me for evaluation. Below are my notes for this consultation.  If you have questions, please do not hesitate to call me. I look forward to following your patient along with you.       Sincerely,     Juwan Ochoa MD      CC: No Recipients  ______________________________________________________________________________________    History Of Present Illness  HPI    The patient is a 74-year-old female who had a right breast lumpectomy and axillary lymph node dissection in 1985 followed by radiation therapy for breast cancer.  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  The patient has no breast or chest lumps.  No pain.  No nipple discharge or retraction.  She complains of a left thigh nodule which she noticed a few months ago.  The nodule has not changed.  She has no pain in the area.  She cannot recall any injury.  No prior similar lumps.     Family history: Maternal great aunt had breast cancer.     Past medical history:  Laparoscopic bilateral oophorectomy  Hypertension    Past Medical History  She has a past medical history of Personal history of malignant neoplasm of breast (2013).    Surgical History  She has a past surgical history that includes Appendectomy (12/23/2015); Lymph node biopsy (12/23/2015); Cataract extraction (12/23/2015); Tonsillectomy (12/23/2015); Other surgical history (10/18/2022); Other surgical history (12/09/2022); Breast surgery (07/01/2020); Other surgical history (10/13/2021); Other surgical history (10/13/2021); and Mastectomy (Right, 2013).     Allergies  Amoxicillin, Erythromycin, and Paroxetine    Social History  She reports that she quit smoking about 39  "years ago. Her smoking use included cigarettes. She started smoking about 54 years ago. She has a 7.5 pack-year smoking history. She has never used smokeless tobacco. She reports current alcohol use. She reports that she does not use drugs.    Family History  Family History   Problem Relation Name Age of Onset   • Hypertension Mother     • Other (BLADDER CANCER) Mother     • Other (CARDIAC DISORDER) Father     • Hypertension Father     • Hypertension Brother     • Stroke Maternal Grandmother     • Stroke Maternal Grandfather     • Stroke Paternal Grandmother     • Stroke Paternal Grandfather     • Breast cancer Mother's Sister  30 - 39     Last Recorded Vitals  Blood pressure 139/73, pulse 72, temperature 36.9 °C (98.4 °F), temperature source Temporal, resp. rate 16, height 1.626 m (5' 4\"), weight 68.2 kg (150 lb 6.4 oz), SpO2 93%.    Physical Exam  Constitutional: Well-developed, well-nourished, alert and oriented, no acute distress  Skin: Warm and dry, no lesions, no rashes, no jaundice  HEENT: Normocephalic, atraumatic, EOMI, no scleral icterus, mucous membranes moist, no lesions seen  Neck: Soft, nontender, no mass or adenopathy  Cardiac: Regular rate and rhythm, no murmur  Chest: Patent airway, clear to auscultation, normal breath sounds with good chest expansion, no wheezes or rales or rhonchi noted, thorax symmetric  Breast:     right breast: Status post mastectomy.  No other skin changes, nontender, no mass.    left breast: No skin changes, nontender, no mass, mild fibrocystic change  Abdomen: Nondistended, soft, nontender, no mass  Rectal: Not performed  Extremities: No injury, no lower extremity edema or calf tenderness.  Left anterior lateral distal thigh about 10 cm superior to the patella, 0.8 cm diameter subcutaneous nodule.  Nontender.  No skin changes.  Lymphatic: No cervical or axillary adenopathy  Musculoskeletal: Range of motion intact, no joint swelling, normal strength  Neurological: Alert and " oriented x3, intact sensory and motor function, no obvious focal neurologic abnormalities  Psychological: Appropriate mood and behavior  Examination chaperoned by Maisha Shaffer    Relevant Results       Assessment/Plan  Diagnoses and all orders for this visit:  Subcutaneous nodule of left lower extremity  -     US extremity nonvascular real time w image documentation limited anatomic specific; Future  Screening mammogram for breast cancer  -     BI mammo left screening tomosynthesis; Future  History of right breast cancer      No evidence of breast cancer recurrence on examination.  Repeat left mammogram December 7, 2024.  Follow-up after mammogram completed.  Breast self-exam monthly.  The patient wishes to continue every 6-month examinations due to her history of 2 episodes of right breast cancer.  She no longer follows with oncology.  Left thigh subcutaneous nodule.  Order ultrasound for further evaluation.  Follow-up in 1 month.       Juwan Ochoa MD

## 2024-05-31 ENCOUNTER — HOSPITAL ENCOUNTER (OUTPATIENT)
Dept: RADIOLOGY | Facility: CLINIC | Age: 74
Discharge: HOME | End: 2024-05-31
Payer: MEDICARE

## 2024-05-31 DIAGNOSIS — R22.42 SUBCUTANEOUS NODULE OF LEFT LOWER EXTREMITY: ICD-10-CM

## 2024-05-31 PROCEDURE — 76882 US LMTD JT/FCL EVL NVASC XTR: CPT | Performed by: RADIOLOGY

## 2024-05-31 PROCEDURE — 76882 US LMTD JT/FCL EVL NVASC XTR: CPT

## 2024-06-04 NOTE — PROGRESS NOTES
History Of Present Illness  HPI   May 29, 2024   The patient is a 74-year-old female who had a right breast lumpectomy and axillary lymph node dissection in 1985 followed by radiation therapy for breast cancer.  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  The patient has no breast or chest lumps.  No pain.  No nipple discharge or retraction.  She complains of a left thigh nodule which she noticed a few months ago.  The nodule has not changed.  She has no pain in the area.  She cannot recall any injury.  No prior similar lumps.    June 5, 2024  The patient follows up after having left thigh ultrasound.  No change in the left thigh nodule.    Family history: Maternal great aunt had breast cancer.     Past medical history:  Laparoscopic bilateral oophorectomy  Hypertension    Past Medical History  She has a past medical history of Personal history of malignant neoplasm of breast (2013).    Surgical History  She has a past surgical history that includes Appendectomy (12/23/2015); Lymph node biopsy (12/23/2015); Cataract extraction (12/23/2015); Tonsillectomy (12/23/2015); Other surgical history (10/18/2022); Other surgical history (12/09/2022); Breast surgery (07/01/2020); Other surgical history (10/13/2021); Other surgical history (10/13/2021); and Mastectomy (Right, 2013).     Allergies  Amoxicillin, Erythromycin, and Paroxetine    Social History  She reports that she quit smoking about 39 years ago. Her smoking use included cigarettes. She started smoking about 54 years ago. She has a 7.5 pack-year smoking history. She has never used smokeless tobacco. She reports current alcohol use. She reports that she does not use drugs.    Family History  Family History   Problem Relation Name Age of Onset    Hypertension Mother      Other (BLADDER CANCER) Mother      Other (CARDIAC DISORDER) Father      Hypertension Father       "Hypertension Brother      Stroke Maternal Grandmother      Stroke Maternal Grandfather      Stroke Paternal Grandmother      Stroke Paternal Grandfather      Breast cancer Mother's Sister  30 - 39         Last Recorded Vitals  Blood pressure 136/76, pulse 78, temperature 36.7 °C (98 °F), temperature source Temporal, resp. rate 16, height 1.626 m (5' 4\"), weight 67.9 kg (149 lb 12.8 oz), SpO2 93%.    Physical Exam   Constitutional: Well-developed, well-nourished, alert and oriented, no acute distress  Skin: Warm and dry, no lesions, no rashes, no jaundice  HEENT: Normocephalic, atraumatic, EOMI, no scleral icterus, mucous membranes moist, no lesions seen  Neck: Soft, nontender, no mass or adenopathy  Cardiac: Regular rate and rhythm, no murmur  Chest: Patent airway, clear to auscultation, normal breath sounds with good chest expansion, no wheezes or rales or rhonchi noted, thorax symmetric  Abdomen: Nondistended, soft, nontender, no mass  Rectal: Not performed  Extremities: No injury, no lower extremity edema or calf tenderness.  Subcutaneous nodule located at left thigh, anterior lateral distal about 10 cm superior to the patella, 0.8 cm diameter.  Nontender.  No skin changes.  Lymphatic: No cervical or left inguinal adenopathy  Musculoskeletal: Range of motion intact, no joint swelling, normal strength  Neurological: Alert and oriented x3, intact sensory and motor function, no obvious focal neurologic abnormalities  Psychological: Appropriate mood and behavior  Examination chaperoned by Belen Adorno    Relevant Results  I reviewed the left thigh ultrasound report and images from May 31, 2024.  IMPRESSION:  0.7 x 0.5 x 0.4 cm solid nodule in the fat along the anterior thigh  suspicious for neoplasm.  I discussed the images with Dr. Silver.  She did not feel that MRI would give any additional information.     Assessment/Plan   Diagnoses and all orders for this visit:  Subcutaneous nodule of left lower " extremity  Left thigh subcutaneous nodule with ultrasound findings as above.  Recommend excisional biopsy.  I discussed the option of core biopsy, but the patient wishes to have the excisional biopsy.  I discussed the procedure and risks with the patient and her .  The risks include bleeding, infection, injury to surrounding structures, cardiac and pulmonary complications.  Electronic consent obtained.        Juwan Ochoa MD

## 2024-06-05 ENCOUNTER — OFFICE VISIT (OUTPATIENT)
Dept: SURGERY | Facility: CLINIC | Age: 74
End: 2024-06-05
Payer: MEDICARE

## 2024-06-05 VITALS
TEMPERATURE: 98 F | HEIGHT: 64 IN | WEIGHT: 149.8 LBS | SYSTOLIC BLOOD PRESSURE: 136 MMHG | HEART RATE: 78 BPM | BODY MASS INDEX: 25.57 KG/M2 | RESPIRATION RATE: 16 BRPM | OXYGEN SATURATION: 93 % | DIASTOLIC BLOOD PRESSURE: 76 MMHG

## 2024-06-05 DIAGNOSIS — R22.42 SUBCUTANEOUS NODULE OF LEFT LOWER EXTREMITY: Primary | ICD-10-CM

## 2024-06-05 PROCEDURE — 99213 OFFICE O/P EST LOW 20 MIN: CPT | Performed by: SURGERY

## 2024-06-05 PROCEDURE — 1036F TOBACCO NON-USER: CPT | Performed by: SURGERY

## 2024-06-05 PROCEDURE — 1157F ADVNC CARE PLAN IN RCRD: CPT | Performed by: SURGERY

## 2024-06-05 PROCEDURE — 3078F DIAST BP <80 MM HG: CPT | Performed by: SURGERY

## 2024-06-05 PROCEDURE — 1159F MED LIST DOCD IN RCRD: CPT | Performed by: SURGERY

## 2024-06-05 PROCEDURE — 3075F SYST BP GE 130 - 139MM HG: CPT | Performed by: SURGERY

## 2024-06-05 RX ORDER — SODIUM CHLORIDE, SODIUM LACTATE, POTASSIUM CHLORIDE, CALCIUM CHLORIDE 600; 310; 30; 20 MG/100ML; MG/100ML; MG/100ML; MG/100ML
100 INJECTION, SOLUTION INTRAVENOUS CONTINUOUS
OUTPATIENT
Start: 2024-06-05

## 2024-06-05 NOTE — LETTER
June 5, 2024     Liyah Valenzuela MD  [address] [facility] [address]    Patient: Anna Marie Bryant   YOB: 1950   Date of Visit: 6/5/2024       Dear Dr. Liyah Valenzuela MD:    Thank you for referring Anna Marie Bryant to me for evaluation. Below are my notes for this consultation.  If you have questions, please do not hesitate to call me. I look forward to following your patient along with you.       Sincerely,     Juwan Ochoa MD      CC: No Recipients  ______________________________________________________________________________________    History Of Present Illness  HPI   May 29, 2024   The patient is a 74-year-old female who had a right breast lumpectomy and axillary lymph node dissection in 1985 followed by radiation therapy for breast cancer.  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  The patient has no breast or chest lumps.  No pain.  No nipple discharge or retraction.  She complains of a left thigh nodule which she noticed a few months ago.  The nodule has not changed.  She has no pain in the area.  She cannot recall any injury.  No prior similar lumps.    June 5, 2024  The patient follows up after having left thigh ultrasound.  No change in the left thigh nodule.    Family history: Maternal great aunt had breast cancer.     Past medical history:  Laparoscopic bilateral oophorectomy  Hypertension    Past Medical History  She has a past medical history of Personal history of malignant neoplasm of breast (2013).    Surgical History  She has a past surgical history that includes Appendectomy (12/23/2015); Lymph node biopsy (12/23/2015); Cataract extraction (12/23/2015); Tonsillectomy (12/23/2015); Other surgical history (10/18/2022); Other surgical history (12/09/2022); Breast surgery (07/01/2020); Other surgical history (10/13/2021); Other surgical history (10/13/2021); and Mastectomy (Right,  "2013).     Allergies  Amoxicillin, Erythromycin, and Paroxetine    Social History  She reports that she quit smoking about 39 years ago. Her smoking use included cigarettes. She started smoking about 54 years ago. She has a 7.5 pack-year smoking history. She has never used smokeless tobacco. She reports current alcohol use. She reports that she does not use drugs.    Family History  Family History   Problem Relation Name Age of Onset   • Hypertension Mother     • Other (BLADDER CANCER) Mother     • Other (CARDIAC DISORDER) Father     • Hypertension Father     • Hypertension Brother     • Stroke Maternal Grandmother     • Stroke Maternal Grandfather     • Stroke Paternal Grandmother     • Stroke Paternal Grandfather     • Breast cancer Mother's Sister  30 - 39         Last Recorded Vitals  Blood pressure 136/76, pulse 78, temperature 36.7 °C (98 °F), temperature source Temporal, resp. rate 16, height 1.626 m (5' 4\"), weight 67.9 kg (149 lb 12.8 oz), SpO2 93%.    Physical Exam   Constitutional: Well-developed, well-nourished, alert and oriented, no acute distress  Skin: Warm and dry, no lesions, no rashes, no jaundice  HEENT: Normocephalic, atraumatic, EOMI, no scleral icterus, mucous membranes moist, no lesions seen  Neck: Soft, nontender, no mass or adenopathy  Cardiac: Regular rate and rhythm, no murmur  Chest: Patent airway, clear to auscultation, normal breath sounds with good chest expansion, no wheezes or rales or rhonchi noted, thorax symmetric  Abdomen: Nondistended, soft, nontender, no mass  Rectal: Not performed  Extremities: No injury, no lower extremity edema or calf tenderness.  Subcutaneous nodule located at left thigh, anterior lateral distal about 10 cm superior to the patella, 0.8 cm diameter.  Nontender.  No skin changes.  Lymphatic: No cervical or left inguinal adenopathy  Musculoskeletal: Range of motion intact, no joint swelling, normal strength  Neurological: Alert and oriented x3, intact " sensory and motor function, no obvious focal neurologic abnormalities  Psychological: Appropriate mood and behavior  Examination chaperoned by Belen Adorno    Relevant Results  I reviewed the left thigh ultrasound report and images from May 31, 2024.  IMPRESSION:  0.7 x 0.5 x 0.4 cm solid nodule in the fat along the anterior thigh  suspicious for neoplasm.  I discussed the images with Dr. Silver.  She did not feel that MRI would give any additional information.     Assessment/Plan  Diagnoses and all orders for this visit:  Subcutaneous nodule of left lower extremity  Left thigh subcutaneous nodule with ultrasound findings as above.  Recommend excisional biopsy.  I discussed the option of core biopsy, but the patient wishes to have the excisional biopsy.  I discussed the procedure and risks with the patient and her .  The risks include bleeding, infection, injury to surrounding structures, cardiac and pulmonary complications.  Electronic consent obtained.        Juwan Ochoa MD

## 2024-06-07 ENCOUNTER — APPOINTMENT (OUTPATIENT)
Dept: SURGERY | Facility: CLINIC | Age: 74
End: 2024-06-07
Payer: MEDICARE

## 2024-06-10 ENCOUNTER — ANESTHESIA EVENT (OUTPATIENT)
Dept: OPERATING ROOM | Facility: HOSPITAL | Age: 74
End: 2024-06-10
Payer: MEDICARE

## 2024-06-17 NOTE — PREPROCEDURE INSTRUCTIONS
Current Medications   Medication Instructions    albuterol (ProAir HFA) 90 mcg/actuation inhaler Ok to take morning of surgery if needed    amLODIPine (Norvasc) 5 mg tablet Take morning of surgery with sip of water, no other fluids    cholecalciferol (Vitamin D3) 1,250 mcg (50,000 unit) tablet Continue until night before surgery    citalopram (CeleXA) 20 mg tablet Take morning of surgery with sip of water, no other fluids    cyanocobalamin, vitamin B-12, (Vitamin B-12) 1,000 mcg tablet extended release Continue until night before surgery    levothyroxine (Synthroid, Levoxyl) 88 mcg tablet Take morning of surgery with sip of water, no other fluids    metoprolol succinate XL (Toprol-XL) 100 mg 24 hr tablet Take morning of surgery with sip of water, no other fluids    rosuvastatin (Crestor) 10 mg tablet Continue until night before surgery              NPO Instructions:    Do not eat any food after midnight the night before your surgery/procedure.  You may have up to 13.5 ounces of clear liquids until TWO hours before your instructed ARRIVAL time. This includes water, black tea/coffee, (no milk or cream) apple juice and electrolyte drinks (Gatorade).    Additional Instructions:     Day of Surgery:    Enter through the Main Entrance of [facility], located at [address] [facility]. Arrive at 8:00 AM for 9:30 AM surgery. Proceed to registration, located in the back right hand corner. You will need your ID and insurance card for registration. Please ensure you have a responsible adult to drive you home.     Take a shower the morning of your procedure. After your shower avoid lotions, powders, deodorants or anything applied to the skin. If you wear contacts or glasses, wear the glasses. If you do not have glasses, please bring a case for your contacts. You may wear hearing aids and dentures, bring a case for them or we will provide one. Make sure you wear something loose and comfortable. Keep in mind your surgery  type and wear something that will accommodate incisions or bandages. Please remove all jewelry. You may take medications discussed during phone call with a small sip of water.    For further questions Torin GALDAMEZ can be contacted at [phone] between 7AM-3PM.

## 2024-06-18 ENCOUNTER — ANESTHESIA (OUTPATIENT)
Dept: OPERATING ROOM | Facility: HOSPITAL | Age: 74
End: 2024-06-18
Payer: MEDICARE

## 2024-06-18 ENCOUNTER — HOSPITAL ENCOUNTER (OUTPATIENT)
Dept: CARDIOLOGY | Facility: HOSPITAL | Age: 74
Discharge: HOME | End: 2024-06-18
Payer: MEDICARE

## 2024-06-18 ENCOUNTER — HOSPITAL ENCOUNTER (OUTPATIENT)
Facility: HOSPITAL | Age: 74
Setting detail: OUTPATIENT SURGERY
Discharge: HOME | End: 2024-06-18
Attending: SURGERY | Admitting: SURGERY
Payer: MEDICARE

## 2024-06-18 VITALS
HEART RATE: 57 BPM | SYSTOLIC BLOOD PRESSURE: 126 MMHG | RESPIRATION RATE: 16 BRPM | TEMPERATURE: 97 F | BODY MASS INDEX: 25.56 KG/M2 | DIASTOLIC BLOOD PRESSURE: 70 MMHG | WEIGHT: 149.69 LBS | HEIGHT: 64 IN | OXYGEN SATURATION: 97 %

## 2024-06-18 DIAGNOSIS — R22.42 SUBCUTANEOUS NODULE OF LEFT LOWER EXTREMITY: Primary | ICD-10-CM

## 2024-06-18 PROCEDURE — 99100 ANES PT EXTEME AGE<1 YR&>70: CPT | Performed by: ANESTHESIOLOGY

## 2024-06-18 PROCEDURE — 2720000007 HC OR 272 NO HCPCS: Performed by: SURGERY

## 2024-06-18 PROCEDURE — 2500000004 HC RX 250 GENERAL PHARMACY W/ HCPCS (ALT 636 FOR OP/ED): Performed by: ANESTHESIOLOGIST ASSISTANT

## 2024-06-18 PROCEDURE — 7100000009 HC PHASE TWO TIME - INITIAL BASE CHARGE: Performed by: SURGERY

## 2024-06-18 PROCEDURE — A27328 PR EXC TUMOR SOFT TISSUE THIGH/KNEE SUBFASC <5CM: Performed by: ANESTHESIOLOGY

## 2024-06-18 PROCEDURE — 7100000010 HC PHASE TWO TIME - EACH INCREMENTAL 1 MINUTE: Performed by: SURGERY

## 2024-06-18 PROCEDURE — 2500000005 HC RX 250 GENERAL PHARMACY W/O HCPCS: Performed by: SURGERY

## 2024-06-18 PROCEDURE — 2500000004 HC RX 250 GENERAL PHARMACY W/ HCPCS (ALT 636 FOR OP/ED): Performed by: SURGERY

## 2024-06-18 PROCEDURE — 3600000003 HC OR TIME - INITIAL BASE CHARGE - PROCEDURE LEVEL THREE: Performed by: SURGERY

## 2024-06-18 PROCEDURE — 0752T DGTZ GLS MCRSCP SLD LVL III: CPT | Mod: TC,PARLAB | Performed by: SURGERY

## 2024-06-18 PROCEDURE — 3700000001 HC GENERAL ANESTHESIA TIME - INITIAL BASE CHARGE: Performed by: SURGERY

## 2024-06-18 PROCEDURE — 27328 EXC THIGH/KNEE TUM DEEP <5CM: CPT | Performed by: SURGERY

## 2024-06-18 PROCEDURE — 2500000005 HC RX 250 GENERAL PHARMACY W/O HCPCS: Performed by: ANESTHESIOLOGIST ASSISTANT

## 2024-06-18 PROCEDURE — A27328 PR EXC TUMOR SOFT TISSUE THIGH/KNEE SUBFASC <5CM: Performed by: ANESTHESIOLOGIST ASSISTANT

## 2024-06-18 PROCEDURE — 3700000002 HC GENERAL ANESTHESIA TIME - EACH INCREMENTAL 1 MINUTE: Performed by: SURGERY

## 2024-06-18 PROCEDURE — 88304 TISSUE EXAM BY PATHOLOGIST: CPT | Performed by: PATHOLOGY

## 2024-06-18 PROCEDURE — 3600000008 HC OR TIME - EACH INCREMENTAL 1 MINUTE - PROCEDURE LEVEL THREE: Performed by: SURGERY

## 2024-06-18 PROCEDURE — 93005 ELECTROCARDIOGRAM TRACING: CPT

## 2024-06-18 RX ORDER — GABAPENTIN 300 MG/1
300 CAPSULE ORAL 3 TIMES DAILY PRN
Qty: 15 CAPSULE | Refills: 0 | Status: SHIPPED | OUTPATIENT
Start: 2024-06-18

## 2024-06-18 RX ORDER — ACETAMINOPHEN 325 MG/1
650 TABLET ORAL EVERY 4 HOURS PRN
Status: DISCONTINUED | OUTPATIENT
Start: 2024-06-18 | End: 2024-06-18 | Stop reason: HOSPADM

## 2024-06-18 RX ORDER — LABETALOL HYDROCHLORIDE 5 MG/ML
5 INJECTION, SOLUTION INTRAVENOUS ONCE AS NEEDED
Status: DISCONTINUED | OUTPATIENT
Start: 2024-06-18 | End: 2024-06-18 | Stop reason: HOSPADM

## 2024-06-18 RX ORDER — BUPIVACAINE HYDROCHLORIDE 5 MG/ML
INJECTION, SOLUTION PERINEURAL AS NEEDED
Status: DISCONTINUED | OUTPATIENT
Start: 2024-06-18 | End: 2024-06-18 | Stop reason: HOSPADM

## 2024-06-18 RX ORDER — HYDROCODONE BITARTRATE AND ACETAMINOPHEN 5; 325 MG/1; MG/1
1 TABLET ORAL EVERY 6 HOURS PRN
Qty: 8 TABLET | Refills: 0 | Status: SHIPPED | OUTPATIENT
Start: 2024-06-18

## 2024-06-18 RX ORDER — SODIUM CHLORIDE, SODIUM LACTATE, POTASSIUM CHLORIDE, CALCIUM CHLORIDE 600; 310; 30; 20 MG/100ML; MG/100ML; MG/100ML; MG/100ML
100 INJECTION, SOLUTION INTRAVENOUS CONTINUOUS
Status: DISCONTINUED | OUTPATIENT
Start: 2024-06-18 | End: 2024-06-18 | Stop reason: HOSPADM

## 2024-06-18 RX ORDER — PROPOFOL 10 MG/ML
INJECTION, EMULSION INTRAVENOUS CONTINUOUS PRN
Status: DISCONTINUED | OUTPATIENT
Start: 2024-06-18 | End: 2024-06-18

## 2024-06-18 RX ORDER — PHENYLEPHRINE HCL IN 0.9% NACL 1 MG/10 ML
SYRINGE (ML) INTRAVENOUS AS NEEDED
Status: DISCONTINUED | OUTPATIENT
Start: 2024-06-18 | End: 2024-06-18

## 2024-06-18 RX ORDER — MIDAZOLAM HYDROCHLORIDE 1 MG/ML
INJECTION, SOLUTION INTRAMUSCULAR; INTRAVENOUS AS NEEDED
Status: DISCONTINUED | OUTPATIENT
Start: 2024-06-18 | End: 2024-06-18

## 2024-06-18 RX ORDER — DIPHENHYDRAMINE HYDROCHLORIDE 50 MG/ML
12.5 INJECTION INTRAMUSCULAR; INTRAVENOUS ONCE AS NEEDED
Status: DISCONTINUED | OUTPATIENT
Start: 2024-06-18 | End: 2024-06-18 | Stop reason: HOSPADM

## 2024-06-18 RX ORDER — HYDROMORPHONE HYDROCHLORIDE 1 MG/ML
1 INJECTION, SOLUTION INTRAMUSCULAR; INTRAVENOUS; SUBCUTANEOUS EVERY 5 MIN PRN
Status: DISCONTINUED | OUTPATIENT
Start: 2024-06-18 | End: 2024-06-18 | Stop reason: HOSPADM

## 2024-06-18 RX ORDER — MEPERIDINE HYDROCHLORIDE 25 MG/ML
12.5 INJECTION INTRAMUSCULAR; INTRAVENOUS; SUBCUTANEOUS EVERY 10 MIN PRN
Status: DISCONTINUED | OUTPATIENT
Start: 2024-06-18 | End: 2024-06-18 | Stop reason: HOSPADM

## 2024-06-18 RX ORDER — ONDANSETRON HYDROCHLORIDE 2 MG/ML
4 INJECTION, SOLUTION INTRAVENOUS ONCE AS NEEDED
Status: DISCONTINUED | OUTPATIENT
Start: 2024-06-18 | End: 2024-06-18 | Stop reason: HOSPADM

## 2024-06-18 RX ORDER — LIDOCAINE HYDROCHLORIDE 10 MG/ML
INJECTION INFILTRATION; PERINEURAL AS NEEDED
Status: DISCONTINUED | OUTPATIENT
Start: 2024-06-18 | End: 2024-06-18 | Stop reason: HOSPADM

## 2024-06-18 RX ORDER — INDOMETHACIN 25 MG/1
CAPSULE ORAL AS NEEDED
Status: DISCONTINUED | OUTPATIENT
Start: 2024-06-18 | End: 2024-06-18 | Stop reason: HOSPADM

## 2024-06-18 RX ORDER — LIDOCAINE HCL/PF 100 MG/5ML
SYRINGE (ML) INTRAVENOUS AS NEEDED
Status: DISCONTINUED | OUTPATIENT
Start: 2024-06-18 | End: 2024-06-18

## 2024-06-18 RX ORDER — MIDAZOLAM HYDROCHLORIDE 1 MG/ML
1 INJECTION, SOLUTION INTRAMUSCULAR; INTRAVENOUS ONCE AS NEEDED
Status: DISCONTINUED | OUTPATIENT
Start: 2024-06-18 | End: 2024-06-18 | Stop reason: HOSPADM

## 2024-06-18 RX ORDER — HYDRALAZINE HYDROCHLORIDE 20 MG/ML
5 INJECTION INTRAMUSCULAR; INTRAVENOUS EVERY 30 MIN PRN
Status: DISCONTINUED | OUTPATIENT
Start: 2024-06-18 | End: 2024-06-18 | Stop reason: HOSPADM

## 2024-06-18 RX ORDER — FENTANYL CITRATE 50 UG/ML
INJECTION, SOLUTION INTRAMUSCULAR; INTRAVENOUS AS NEEDED
Status: DISCONTINUED | OUTPATIENT
Start: 2024-06-18 | End: 2024-06-18

## 2024-06-18 SDOH — HEALTH STABILITY: MENTAL HEALTH: CURRENT SMOKER: 0

## 2024-06-18 ASSESSMENT — ENCOUNTER SYMPTOMS
LOSS OF SENSATION IN FEET: 0
DEPRESSION: 0
OCCASIONAL FEELINGS OF UNSTEADINESS: 0

## 2024-06-18 ASSESSMENT — PAIN - FUNCTIONAL ASSESSMENT
PAIN_FUNCTIONAL_ASSESSMENT: 0-10

## 2024-06-18 ASSESSMENT — PAIN SCALES - GENERAL
PAINLEVEL_OUTOF10: 0 - NO PAIN

## 2024-06-18 ASSESSMENT — COLUMBIA-SUICIDE SEVERITY RATING SCALE - C-SSRS
2. HAVE YOU ACTUALLY HAD ANY THOUGHTS OF KILLING YOURSELF?: NO
1. IN THE PAST MONTH, HAVE YOU WISHED YOU WERE DEAD OR WISHED YOU COULD GO TO SLEEP AND NOT WAKE UP?: NO
6. HAVE YOU EVER DONE ANYTHING, STARTED TO DO ANYTHING, OR PREPARED TO DO ANYTHING TO END YOUR LIFE?: NO

## 2024-06-18 NOTE — ANESTHESIA POSTPROCEDURE EVALUATION
Patient: Anna Marie Bryant    Procedure Summary       Date: 06/18/24 Room / Location: [facility] OR 08 / [facility]    Anesthesia Start: 0957 Anesthesia Stop: 1103    Procedure: LEFT THIGH SUBCUTANEOUS NEOPLASM & FASCIA EXCISION (Left) Diagnosis:       Subcutaneous nodule of left lower extremity      (Subcutaneous nodule of left lower extremity [R22.42])    Surgeons: Juwan Ochoa MD Responsible Provider: Randell Davis MD    Anesthesia Type: MAC ASA Status: 3            Anesthesia Type: MAC    Vitals Value Taken Time   /58 06/18/24 1145   Temp 36.3 06/18/24 1146   Pulse 60 06/18/24 1145   Resp 16 06/18/24 1130   SpO2 96 % 06/18/24 1145   Vitals shown include unfiled device data.    Anesthesia Post Evaluation    Patient location during evaluation: PACU  Patient participation: complete - patient participated  Level of consciousness: awake and alert  Pain management: adequate  Airway patency: patent  Cardiovascular status: acceptable  Respiratory status: acceptable  Hydration status: acceptable  Postoperative Nausea and Vomiting: none        No notable events documented.

## 2024-06-18 NOTE — ANESTHESIA PREPROCEDURE EVALUATION
Patient: Anna Marie Bryant    Procedure Information       Date/Time: 06/18/24 0930    Procedure: LEFT THIGH SUBCUTANEOUS NEOPLASM & FASCIA EXCISION (Left) - Excision of left thigh subcutaneous neoplasm and fascia    Location: [facility] 08 / [facility]    Surgeons: Juwan Ochoa MD            Relevant Problems   Anesthesia (within normal limits)      Cardiac   (+) Hyperlipidemia   (+) Hypertension      Neuro   (+) Anxiety state   (+) Depression      Endocrine   (+) Hypothyroidism      GYN   (+) Malignant neoplasm of overlapping sites of right female breast (Multi)       Clinical information reviewed:   Tobacco  Allergies  Meds   Med Hx  Surg Hx   Fam Hx  Soc Hx        NPO Detail:  NPO/Void Status  Date of Last Liquid: 06/17/24  Time of Last Liquid: 0645  Date of Last Solid: 06/17/24  Time of Last Solid: 2100         Physical Exam    Airway  Mallampati: II  TM distance: >3 FB  Neck ROM: full     Cardiovascular - normal exam  Rhythm: regular  Rate: normal     Dental - normal exam     Pulmonary - normal exam     Abdominal            Anesthesia Plan    History of general anesthesia?: yes  History of complications of general anesthesia?: no    ASA 3     MAC     The patient is not a current smoker.    intravenous induction   Postoperative administration of opioids is intended.  Anesthetic plan and risks discussed with patient.    Plan discussed with [facility].

## 2024-06-18 NOTE — OP NOTE
LEFT THIGH SUBCUTANEOUS NEOPLASM & FASCIA EXCISION (L) Operative Note     Date: 2024  OR Location: [facility]    Name: Anna Marie Bryant, : 1950, Age: 74 y.o., MRN: [id number], Sex: female    Diagnosis  Pre-op Diagnosis     * Subcutaneous nodule of left lower extremity [R22.42] Post-op Diagnosis     * Subcutaneous nodule of left lower extremity [R22.42]     Procedures  LEFT THIGH SUBCUTANEOUS NEOPLASM & FASCIA EXCISION  15148 - CT EXC TUMOR SOFT TISSUE THIGH/KNEE SUBFASC <5CM      Surgeons      * Juwan Ochoa - Primary    Resident/Fellow/Other Assistant:  Surgeons and Role:  * No surgeons found with a matching role *    Procedure Summary  Anesthesia: Monitor Anesthesia Care  ASA: III  Anesthesia Staff: Anesthesiologist: Randell Davis MD  C-AA: JESSIE Vasquez  Estimated Blood Loss: 0mL  Intra-op Medications:   Administrations occurring from 0930 to 1100 on 24:   Medication Name Total Dose   lidocaine (Xylocaine) 10 mg/mL (1 %) injection 5.6 mg   sodium bicarbonate 1 mEq/mL (8.4 %) injection 1.4 mEq   BUPivacaine HCl (Marcaine) 0.5 % (5 mg/mL) injection 6 mL              Anesthesia Record               Intraprocedure I/O Totals          Intake    Propofol Drip 0.00 mL    The total shown is the total volume documented since Anesthesia Start was filed.    Total Intake 0 mL          Specimen:   ID Type Source Tests Collected by Time   1 : left thigh neoplasm long silk stitch lateral short silk stitch superior Tissue SOFT TISSUE BIOPSY SURGICAL PATHOLOGY EXAM Juwan Ochoa MD 2024 1028        Staff:   Circulator: Josefina Mendoza Person: Yolanda  Circulator: Yajaira Gagnon Circulator: Dora         Drains and/or Catheters: * None in log *    Tourniquet Times:         Implants:     Findings: Subcutaneous nodule adjacent to fascia    Indications: Anna Marie Bryant is an 74 y.o. female who is having surgery for Subcutaneous nodule of left lower extremity [R22.42].  Ultrasound showed a 0.7 cm diameter  nodule deep within the subcutaneous fat which appeared to be attached to fascia and was concerning for neoplasm.    The patient was seen in the preoperative area. The risks, benefits, complications, treatment options, non-operative alternatives, expected recovery and outcomes were discussed with the patient. The possibilities of reaction to medication, pulmonary aspiration, injury to surrounding structures, bleeding, recurrent infection, the need for additional procedures, failure to diagnose a condition, and creating a complication requiring transfusion or operation were discussed with the patient. The patient concurred with the proposed plan, giving informed consent.  The site of surgery was properly noted/marked if necessary per policy. The patient has been actively warmed in preoperative area. Preoperative antibiotics are not indicated. Venous thrombosis prophylaxis have been ordered including bilateral sequential compression devices    Procedure Details: Following informed consent the patient was taken the operating placed in supine position.  A huddle was performed.  She was given MAC anesthetic.  Sequential compression devices were in place and functioning.  The left thigh was prepped and draped in sterile fashion.  A timeout was performed.  The palpable nodule was located in the distal anterior lateral left thigh about 10 cm superior to the patella.  Following infiltration of local anesthetic a longitudinal elliptical skin incision was made directly overlying the nodule.  During dissection care was taken to try to take a margin of normal tissue surrounding the nodule.  The incision was extended through the subcutaneous tissue to the muscle fascia and a portion of muscle fascia just deep to the nodule was excised.  The specimen was marked at the skin surface with a long silk suture lateral and short silk superior and sent fresh to pathology.  The wound was palpated and no additional abnormal tissue was noted.   The wound was irrigated with saline.  Hemostasis appeared to be excellent.  The fascia was closed in a longitudinal direction with interrupted 3-0 Vicryl sutures.  Half percent plain Marcaine was infiltrated.  The subcutaneous tissue was closed with interrupted 3-0 Vicryl sutures.  Skin was closed with interrupted 3-0 and 4-0 nylon sutures.  A dressing was placed.  The patient was taken to the recovery in satisfactory condition having tolerated the procedure well.  Counts are correct.  Complications:  None; patient tolerated the procedure well.    Disposition: PACU - hemodynamically stable.  Condition: stable             Juwan Ochoa  Phone Number: [phone]

## 2024-06-20 LAB
ATRIAL RATE: 59 BPM
P AXIS: 95 DEGREES
P OFFSET: 193 MS
P ONSET: 153 MS
PR INTERVAL: 144 MS
Q ONSET: 225 MS
QRS COUNT: 9 BEATS
QRS DURATION: 70 MS
QT INTERVAL: 434 MS
QTC CALCULATION(BAZETT): 429 MS
QTC FREDERICIA: 431 MS
R AXIS: 25 DEGREES
T AXIS: 5 DEGREES
T OFFSET: 442 MS
VENTRICULAR RATE: 59 BPM

## 2024-07-02 PROBLEM — Z09 POSTOP CHECK: Status: ACTIVE | Noted: 2024-07-02

## 2024-07-02 LAB
LABORATORY COMMENT REPORT: NORMAL
PATH REPORT.FINAL DX SPEC: NORMAL
PATH REPORT.GROSS SPEC: NORMAL
PATH REPORT.RELEVANT HX SPEC: NORMAL
PATH REPORT.TOTAL CANCER: NORMAL

## 2024-07-02 NOTE — PROGRESS NOTES
"History Of Present Illness  Anna Marie Bryant is a 74 y.o. female status post excision of left thigh subcutaneous neoplasm on June 18, 2024.  The neoplasm was adjacent to the muscle fascia and the attached to fascia was excised with the specimen.  No complaints.  No pain.    Last Recorded Vitals  Blood pressure 129/78, pulse 66, temperature 36.4 °C (97.6 °F), temperature source Temporal, resp. rate 16, height 1.626 m (5' 4\"), weight 67.7 kg (149 lb 3.2 oz), SpO2 91%.    Physical Exam  General: Well-developed, well-nourished, no acute distress, alert and oriented  Wound: Intact, no erythema or signs of infection  Sutures removed.    Relevant Results  Surgical Pathology Exam: [id number]  Order: [id number]   Collected 6/18/2024 10:28       Status: Final result       Visible to patient: Yes (not seen)       Dx: Subcutaneous nodule of left lower ext...    0 Result Notes       Component  Resulting Agency   FINAL DIAGNOSIS   A. SKIN, LEFT THIGH, EXCISION:  --WELL-DIFFERENTIATED FIBROADIPOSE TISSUE CONSISTENT WITH LIPOMA WITH FOCAL REACTIVE CHANGES     COMMENT: Sections show a ill-defined collection of well differentiated fibroadipose tissue extending to fascia with focal intralesional stellate-shaped fibrosis suggesting prior trauma.  Clinicopathologic correlation is recommended.   Electronically signed by Nedra Rodriguez MD on 7/2/2024 at 1526           Assessment/Plan   Diagnoses and all orders for this visit:  Postop check    Recovering well.  Pathology benign, consistent with fibrosis from prior trauma.  Follow-up as needed for the left thigh wound.  Follow-up as scheduled for routine breast exam.    Juwan Ochoa MD  "

## 2024-07-03 ENCOUNTER — APPOINTMENT (OUTPATIENT)
Dept: SURGERY | Facility: CLINIC | Age: 74
End: 2024-07-03
Payer: MEDICARE

## 2024-07-03 VITALS
OXYGEN SATURATION: 91 % | WEIGHT: 149.2 LBS | RESPIRATION RATE: 16 BRPM | HEART RATE: 66 BPM | HEIGHT: 64 IN | BODY MASS INDEX: 25.47 KG/M2 | TEMPERATURE: 97.6 F | DIASTOLIC BLOOD PRESSURE: 78 MMHG | SYSTOLIC BLOOD PRESSURE: 129 MMHG

## 2024-07-03 DIAGNOSIS — Z09 POSTOP CHECK: Primary | ICD-10-CM

## 2024-07-03 PROCEDURE — 1036F TOBACCO NON-USER: CPT | Performed by: SURGERY

## 2024-07-03 PROCEDURE — 3074F SYST BP LT 130 MM HG: CPT | Performed by: SURGERY

## 2024-07-03 PROCEDURE — 99024 POSTOP FOLLOW-UP VISIT: CPT | Performed by: SURGERY

## 2024-07-03 PROCEDURE — 1159F MED LIST DOCD IN RCRD: CPT | Performed by: SURGERY

## 2024-07-03 PROCEDURE — 1157F ADVNC CARE PLAN IN RCRD: CPT | Performed by: SURGERY

## 2024-07-03 PROCEDURE — 3078F DIAST BP <80 MM HG: CPT | Performed by: SURGERY

## 2024-07-03 NOTE — LETTER
"July 3, 2024     Liyah Valenzuela MD  [address] [facility] [address]    Patient: Anna Marie Bryant   YOB: 1950   Date of Visit: 7/3/2024       Dear Dr. Liyah Valenzuela MD:    Thank you for referring Anna Marie Bryant to me for evaluation. Below are my notes for this consultation.  If you have questions, please do not hesitate to call me. I look forward to following your patient along with you.       Sincerely,     Juwan Ochoa MD      CC: No Recipients  ______________________________________________________________________________________    History Of Present Illness  Anna Marie Bryant is a 74 y.o. female status post excision of left thigh subcutaneous neoplasm on June 18, 2024.  The neoplasm was adjacent to the muscle fascia and the attached to fascia was excised with the specimen.  No complaints.  No pain.    Last Recorded Vitals  Blood pressure 129/78, pulse 66, temperature 36.4 °C (97.6 °F), temperature source Temporal, resp. rate 16, height 1.626 m (5' 4\"), weight 67.7 kg (149 lb 3.2 oz), SpO2 91%.    Physical Exam  General: Well-developed, well-nourished, no acute distress, alert and oriented  Wound: Intact, no erythema or signs of infection  Sutures removed.    Relevant Results  Surgical Pathology Exam: [id number]  Order: [id number]   Collected 6/18/2024 10:28       Status: Final result       Visible to patient: Yes (not seen)       Dx: Subcutaneous nodule of left lower ext...    0 Result Notes       Component  Resulting Agency   FINAL DIAGNOSIS   A. SKIN, LEFT THIGH, EXCISION:  --WELL-DIFFERENTIATED FIBROADIPOSE TISSUE CONSISTENT WITH LIPOMA WITH FOCAL REACTIVE CHANGES     COMMENT: Sections show a ill-defined collection of well differentiated fibroadipose tissue extending to fascia with focal intralesional stellate-shaped fibrosis suggesting prior trauma.  Clinicopathologic correlation is recommended.   Electronically signed by Nedra Rodriguez MD on 7/2/2024 at 1526         "   Assessment/Plan  Diagnoses and all orders for this visit:  Postop check    Recovering well.  Pathology benign, consistent with fibrosis from prior trauma.  Follow-up as needed for the left thigh wound.  Follow-up as scheduled for routine breast exam.    Juwan Ochoa MD

## 2024-10-09 DIAGNOSIS — I10 HYPERTENSION, UNSPECIFIED TYPE: ICD-10-CM

## 2024-10-09 RX ORDER — AMLODIPINE BESYLATE 5 MG/1
5 TABLET ORAL DAILY
Qty: 90 TABLET | Refills: 3 | Status: SHIPPED | OUTPATIENT
Start: 2024-10-09

## 2024-10-28 LAB
NON-UH HIE A/G RATIO: 1.5
NON-UH HIE ALB: 4 G/DL (ref 3.4–5)
NON-UH HIE ALK PHOS: 70 UNIT/L (ref 45–117)
NON-UH HIE BASO COUNT: 0.04 X1000 (ref 0–0.2)
NON-UH HIE BASOS %: 1.1 %
NON-UH HIE BILIRUBIN, TOTAL: 0.7 MG/DL (ref 0.3–1.2)
NON-UH HIE BUN/CREAT RATIO: 19
NON-UH HIE BUN: 19 MG/DL (ref 9–23)
NON-UH HIE CALCIUM: 9.6 MG/DL (ref 8.7–10.4)
NON-UH HIE CALCULATED LDL CHOLESTEROL: 88 MG/DL (ref 60–130)
NON-UH HIE CALCULATED OSMOLALITY: 285 MOSM/KG (ref 275–295)
NON-UH HIE CHLORIDE: 104 MMOL/L (ref 98–107)
NON-UH HIE CHOLESTEROL: 163 MG/DL (ref 100–200)
NON-UH HIE CO2, VENOUS: 32 MMOL/L (ref 20–31)
NON-UH HIE CREATININE: 1 MG/DL (ref 0.5–0.8)
NON-UH HIE DIFF?: NO
NON-UH HIE EOS COUNT: 0.18 X1000 (ref 0–0.5)
NON-UH HIE EOSIN %: 4.9 %
NON-UH HIE GFR AA: >60
NON-UH HIE GLOBULIN: 2.7 G/DL
NON-UH HIE GLOMERULAR FILTRATION RATE: 54 ML/MIN/1.73M?
NON-UH HIE GLUCOSE: 90 MG/DL (ref 74–106)
NON-UH HIE GOT: 17 UNIT/L (ref 15–37)
NON-UH HIE GPT: 9 UNIT/L (ref 10–49)
NON-UH HIE HCT: 39.7 % (ref 36–46)
NON-UH HIE HDL CHOLESTEROL: 59 MG/DL (ref 40–60)
NON-UH HIE HEPATITIS C ANTIBODY: NONREACTIVE
NON-UH HIE HGB: 13.5 G/DL (ref 12–16)
NON-UH HIE INSTR WBC: 3.7
NON-UH HIE K: 4.3 MMOL/L (ref 3.5–5.1)
NON-UH HIE LYMPH %: 24.4 %
NON-UH HIE LYMPH COUNT: 0.9 X1000 (ref 1.2–4.8)
NON-UH HIE MCH: 30.1 PG (ref 27–34)
NON-UH HIE MCHC: 34 G/DL (ref 32–37)
NON-UH HIE MCV: 88.5 FL (ref 80–100)
NON-UH HIE MONO %: 10.4 %
NON-UH HIE MONO COUNT: 0.39 X1000 (ref 0.1–1)
NON-UH HIE MPV: 8.2 FL (ref 7.4–10.4)
NON-UH HIE NA: 142 MMOL/L (ref 135–145)
NON-UH HIE NEUTROPHIL %: 59.1 %
NON-UH HIE NEUTROPHIL COUNT (ANC): 2.19 X1000 (ref 1.4–8.8)
NON-UH HIE NUCLEATED RBC: 0 /100WBC
NON-UH HIE PLATELET: 204 X10 (ref 150–450)
NON-UH HIE RBC: 4.48 X10 (ref 4.2–5.4)
NON-UH HIE RDW: 13.8 % (ref 11.5–14.5)
NON-UH HIE TOTAL CHOL/HDL CHOL RATIO: 2.8
NON-UH HIE TOTAL PROTEIN: 6.7 G/DL (ref 5.7–8.2)
NON-UH HIE TRIGLYCERIDES: 82 MG/DL (ref 30–150)
NON-UH HIE TSH: 3.96 UIU/ML (ref 0.55–4.78)
NON-UH HIE VIT D 25: 41 NG/ML
NON-UH HIE VITAMIN B12: 520 PG/ML (ref 211–911)
NON-UH HIE WBC: 3.7 X10 (ref 4.5–11)

## 2024-11-11 PROBLEM — Z09 POSTOP CHECK: Status: RESOLVED | Noted: 2024-07-02 | Resolved: 2024-11-11

## 2024-11-12 ENCOUNTER — APPOINTMENT (OUTPATIENT)
Dept: PRIMARY CARE | Facility: CLINIC | Age: 74
End: 2024-11-12
Payer: MEDICARE

## 2024-11-12 VITALS
DIASTOLIC BLOOD PRESSURE: 68 MMHG | HEIGHT: 64 IN | SYSTOLIC BLOOD PRESSURE: 122 MMHG | OXYGEN SATURATION: 96 % | BODY MASS INDEX: 25.51 KG/M2 | WEIGHT: 149.4 LBS | HEART RATE: 60 BPM | TEMPERATURE: 97.9 F

## 2024-11-12 DIAGNOSIS — F32.A DEPRESSION, UNSPECIFIED DEPRESSION TYPE: ICD-10-CM

## 2024-11-12 DIAGNOSIS — E03.9 HYPOTHYROIDISM, UNSPECIFIED TYPE: ICD-10-CM

## 2024-11-12 DIAGNOSIS — I10 HYPERTENSION, UNSPECIFIED TYPE: ICD-10-CM

## 2024-11-12 DIAGNOSIS — E28.319 ASYMPTOMATIC PREMATURE MENOPAUSE: Primary | ICD-10-CM

## 2024-11-12 DIAGNOSIS — E78.5 HYPERLIPIDEMIA, UNSPECIFIED HYPERLIPIDEMIA TYPE: ICD-10-CM

## 2024-11-12 DIAGNOSIS — F41.1 ANXIETY STATE: ICD-10-CM

## 2024-11-12 PROCEDURE — 1126F AMNT PAIN NOTED NONE PRSNT: CPT | Performed by: INTERNAL MEDICINE

## 2024-11-12 PROCEDURE — 1123F ACP DISCUSS/DSCN MKR DOCD: CPT | Performed by: INTERNAL MEDICINE

## 2024-11-12 PROCEDURE — 1036F TOBACCO NON-USER: CPT | Performed by: INTERNAL MEDICINE

## 2024-11-12 PROCEDURE — 1159F MED LIST DOCD IN RCRD: CPT | Performed by: INTERNAL MEDICINE

## 2024-11-12 PROCEDURE — 1157F ADVNC CARE PLAN IN RCRD: CPT | Performed by: INTERNAL MEDICINE

## 2024-11-12 PROCEDURE — 3078F DIAST BP <80 MM HG: CPT | Performed by: INTERNAL MEDICINE

## 2024-11-12 PROCEDURE — 3074F SYST BP LT 130 MM HG: CPT | Performed by: INTERNAL MEDICINE

## 2024-11-12 PROCEDURE — 3008F BODY MASS INDEX DOCD: CPT | Performed by: INTERNAL MEDICINE

## 2024-11-12 PROCEDURE — 99214 OFFICE O/P EST MOD 30 MIN: CPT | Performed by: INTERNAL MEDICINE

## 2024-11-12 PROCEDURE — 1158F ADVNC CARE PLAN TLK DOCD: CPT | Performed by: INTERNAL MEDICINE

## 2024-11-12 RX ORDER — METOPROLOL SUCCINATE 100 MG/1
100 TABLET, EXTENDED RELEASE ORAL DAILY
Qty: 90 TABLET | Refills: 3 | Status: SHIPPED | OUTPATIENT
Start: 2024-11-12

## 2024-11-12 RX ORDER — CITALOPRAM 20 MG/1
20 TABLET, FILM COATED ORAL DAILY
Qty: 90 TABLET | Refills: 3 | Status: SHIPPED | OUTPATIENT
Start: 2024-11-12

## 2024-11-12 RX ORDER — ROSUVASTATIN CALCIUM 10 MG/1
10 TABLET, COATED ORAL DAILY
Qty: 90 TABLET | Refills: 3 | Status: SHIPPED | OUTPATIENT
Start: 2024-11-12

## 2024-11-12 RX ORDER — LEVOTHYROXINE SODIUM 88 UG/1
88 TABLET ORAL DAILY
Qty: 90 TABLET | Refills: 3 | Status: SHIPPED | OUTPATIENT
Start: 2024-11-12

## 2024-11-12 ASSESSMENT — PATIENT HEALTH QUESTIONNAIRE - PHQ9
2. FEELING DOWN, DEPRESSED OR HOPELESS: NOT AT ALL
1. LITTLE INTEREST OR PLEASURE IN DOING THINGS: NOT AT ALL
SUM OF ALL RESPONSES TO PHQ9 QUESTIONS 1 & 2: 0

## 2024-11-12 ASSESSMENT — PAIN SCALES - GENERAL: PAINLEVEL_OUTOF10: 0-NO PAIN

## 2024-11-12 NOTE — PROGRESS NOTES
"Chief Complaint   Patient presents with    Follow-up     Pt here for 6 mo FUV       74 y.o. woman follow up.   Last visit  05/2024..  Accompanied by her .    Never had covid. Feeling well.   Mammogram early December and fup with Dr Ochoa.  No complaints.      Past Medical History  Right breast cancer 1985 age 35 lumpectomy and radiation treatment.   Breast cancer diagnosed 2013, right mastectomy, Dr Ochoa [facility].   Hypothyroidism  Hyperlipidemia  HTN  Anxiety  B12 deficiency  Remote pneumonia  Leukopenia, Dr Antunez (patient indicates stable and did not need follow up)  Oophorectomy 1976, ovarian cyst ruptured.      Social History  Former smoker quit age 31  . no children, active in social groups    Blood pressure 122/68, pulse 60, temperature 36.6 °C (97.9 °F), height 1.626 m (5' 4\"), weight 67.8 kg (149 lb 6.4 oz), SpO2 96%.  Body mass index is 25.64 kg/m².    Physical Examination  General: NAD. Alert.     Lymph nodes: No cervical or clavicular adenopathy  Cardiovascular: Regular rate rhythm S1-S2 normal no murmur. No carotid bruit.   Lungs: Clear to Auscultation without wheezing, rales, or rhonchi, Breath sounds symmetric. No use of accessory muscles  Abdomen:  Normoactive bowel sounds, soft, non-tender. No mass.  No organomegaly  Extremities: No pretibial edema  Neuro: no facial asymmetry. Strength upper and lower extremities 5/5. Sensation intact to light touch. No tremor. Babinski negative  Skin: no rash noted.  Very small nodule lower left lateral thigh.  Mobile.      Labs April 2024 TSH, vitamin D, lipid, B12, CMP, CBC  TSH 2.29 B12 682 vitamin D low at 24  Cholesterol 161 HDL 64 LDL 79 triglycerides 91  Creatinine 0.9 glucose 91 liver function test negative  White blood cell count slightly low at 4.4 hemoglobin 13.3 platelet 391    No results found for: \"HGBA1C\"  Lab Results   Component Value Date    GLUCOSE 102 (H) 10/18/2023    CALCIUM 9.6 10/18/2023     10/18/2023    K 4.2 " "10/18/2023    CO2 27 10/18/2023     10/18/2023    BUN 22 10/18/2023    CREATININE 0.89 10/18/2023     Lab Results   Component Value Date    ALT 7 04/17/2023    AST 13 04/17/2023    ALKPHOS 61 04/17/2023    BILITOT 0.7 04/17/2023     Lab Results   Component Value Date    WBC 4.4 10/18/2023    HGB 13.5 10/18/2023    HCT 42.7 10/18/2023    MCV 97 10/18/2023     10/18/2023     Lab Results   Component Value Date    CHOL 131 10/06/2022     Lab Results   Component Value Date    HDL 58.7 10/06/2022     No results found for: \"LDLCALC\"  Lab Results   Component Value Date    TRIG 79 10/06/2022     ASSESSMENT/PLAN    Living Will: has a living will.   Women: mammogram: Left diagnostic December 2023 Dr Ochoa  Dexa: September 2022  Colon cancer screening 45 and older: Colonoscopy August 2020 2 repeat due 2027 Dr Morrison  Immunization: had flu and covid vaccines.     1. Depression, unspecified depression type    - citalopram (CeleXA) 20 mg tablet; Take 1 tablet (20 mg) by mouth once daily.  Dispense: 90 tablet; Refill: 3    2. Anxiety state  - citalopram (CeleXA) 20 mg tablet; Take 1 tablet (20 mg) by mouth once daily.  Dispense: 90 tablet; Refill: 3    3. Hypothyroidism, unspecified type  - levothyroxine (Synthroid, Levoxyl) 88 mcg tablet; Take 1 tablet (88 mcg) by mouth once daily.  Dispense: 90 tablet; Refill: 3    4. Hypertension, unspecified type  - metoprolol succinate XL (Toprol-XL) 100 mg 24 hr tablet; Take 1 tablet (100 mg) by mouth once daily.  Dispense: 90 tablet; Refill: 3  - CBC and Auto Differential; Future  - Comprehensive Metabolic Panel; Future  - Lipid Panel; Future    5. Hyperlipidemia, unspecified hyperlipidemia type  - rosuvastatin (Crestor) 10 mg tablet; Take 1 tablet (10 mg) by mouth once daily.  Dispense: 90 tablet; Refill: 3  - Comprehensive Metabolic Panel; Future  - Lipid Panel; Future    6. Asymptomatic premature menopause (Primary)  - XR DEXA bone density; Future    Labs and dexa in 6 " months    Current Outpatient Medications on File Prior to Visit   Medication Sig Dispense Refill    albuterol (ProAir HFA) 90 mcg/actuation inhaler Inhale 2 puffs every 4 hours if needed for wheezing or shortness of breath. 8.5 g 0    amLODIPine (Norvasc) 5 mg tablet Take 1 tablet (5 mg) by mouth once daily. as directed 90 tablet 3    citalopram (CeleXA) 20 mg tablet Take 1 tablet (20 mg) by mouth once daily. 90 tablet 3    cyanocobalamin, vitamin B-12, (Vitamin B-12) 1,000 mcg tablet extended release Take 1 tablet (1,000 mcg) by mouth once daily.      levothyroxine (Synthroid, Levoxyl) 88 mcg tablet Take 1 tablet (88 mcg) by mouth once daily. 90 tablet 3    metoprolol succinate XL (Toprol-XL) 100 mg 24 hr tablet Take 1 tablet (100 mg) by mouth once daily. 90 tablet 3    rosuvastatin (Crestor) 10 mg tablet Take 1 tablet (10 mg) by mouth once daily. 90 tablet 3    [DISCONTINUED] cholecalciferol (Vitamin D3) 1,250 mcg (50,000 unit) tablet 1 po a week for 8 weeks. After completing take otc vitamin D 2000 units a day. (Patient not taking: Reported on 11/12/2024) 8 tablet 0    [DISCONTINUED] gabapentin (Neurontin) 300 mg capsule Take 1 capsule (300 mg) by mouth 3 times a day as needed (mild pain) for up to 15 doses. (Patient not taking: Reported on 11/12/2024) 15 capsule 0    [DISCONTINUED] HYDROcodone-acetaminophen (Norco) 5-325 mg tablet Take 1 tablet by mouth every 6 hours if needed for severe pain (7 - 10) (As needed if pain is not relieved with combination of gabapentin, Tylenol and ibuprofen) for up to 8 doses. (Patient not taking: Reported on 11/12/2024) 8 tablet 0     No current facility-administered medications on file prior to visit.

## 2024-12-10 ENCOUNTER — HOSPITAL ENCOUNTER (OUTPATIENT)
Dept: RADIOLOGY | Facility: CLINIC | Age: 74
Discharge: HOME | End: 2024-12-10
Payer: MEDICARE

## 2024-12-10 DIAGNOSIS — Z12.31 SCREENING MAMMOGRAM FOR BREAST CANCER: ICD-10-CM

## 2024-12-10 PROCEDURE — 77067 SCR MAMMO BI INCL CAD: CPT | Mod: 52,LT

## 2024-12-10 PROCEDURE — 77063 BREAST TOMOSYNTHESIS BI: CPT | Mod: LEFT SIDE | Performed by: RADIOLOGY

## 2024-12-10 PROCEDURE — 77067 SCR MAMMO BI INCL CAD: CPT | Mod: LEFT SIDE | Performed by: RADIOLOGY

## 2024-12-22 NOTE — PROGRESS NOTES
History Of Present Illness  HPI     The patient is a 74-year-old female who had a right breast lumpectomy and axillary lymph node dissection in 1985 followed by radiation therapy for breast cancer.  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  She no longer follows with oncology.    The patient has no breast or chest lumps.  No pain.  No nipple discharge or retraction.    Family history: Maternal great aunt had breast cancer.     Past medical history:  Laparoscopic bilateral oophorectomy  Hypertension    Past Medical History  She has a past medical history of Personal history of malignant neoplasm of breast (2013).    Surgical History  She has a past surgical history that includes Appendectomy (12/23/2015); Lymph node biopsy (12/23/2015); Cataract extraction (12/23/2015); Tonsillectomy (12/23/2015); Other surgical history (10/18/2022); Other surgical history (12/09/2022); Breast surgery (07/01/2020); Other surgical history (10/13/2021); Other surgical history (10/13/2021); and Mastectomy (Right, 2013).     Allergies  Amoxicillin, Erythromycin, Duloxetine, Paroxetine, Sulfamethoxazole, and Trimethoprim    Social History  She reports that she quit smoking about 40 years ago. Her smoking use included cigarettes. She started smoking about 55 years ago. She has a 7.5 pack-year smoking history. She has never used smokeless tobacco. She reports current alcohol use. She reports that she does not use drugs.    Family History  Family History   Problem Relation Name Age of Onset    Hypertension Mother      Other (BLADDER CANCER) Mother      Other (CARDIAC DISORDER) Father      Hypertension Father      Hypertension Brother      Stroke Maternal Grandmother      Stroke Maternal Grandfather      Stroke Paternal Grandmother      Stroke Paternal Grandfather      Breast cancer Mother's Sister  30 - 39       Last Recorded Vitals  Blood  "pressure 132/75, pulse 67, temperature 36.3 °C (97.3 °F), temperature source Temporal, resp. rate 16, height 1.626 m (5' 4\"), weight 68.6 kg (151 lb 3.2 oz), SpO2 96%.    Physical Exam  Constitutional: Well-developed, well-nourished, alert and oriented, no acute distress  Skin: Warm and dry, no lesions, no rashes, no jaundice  HEENT: Normocephalic, atraumatic, EOMI, no scleral icterus, mucous membranes moist, no lesions seen  Neck: Soft, nontender, no mass or adenopathy  Cardiac: Regular rate and rhythm, no murmur  Chest: Patent airway, clear to auscultation, normal breath sounds with good chest expansion, no wheezes or rales or rhonchi noted, thorax symmetric  Breast:     right breast: Status post right mastectomy, nontender, no mass    left breast: No skin changes, nontender, no mass, mild fibrocystic change  Abdomen: Nondistended, soft, nontender, no mass  Rectal: Not performed  Extremities: No injury, no lower extremity edema or calf tenderness  Lymphatic: No cervical or axillary adenopathy  Musculoskeletal: Range of motion intact, no joint swelling, normal strength  Neurological: Alert and oriented x3, intact sensory and motor function, no obvious focal neurologic abnormalities  Psychological: Appropriate mood and behavior  Examination chaperoned by Natali    Relevant Results  I reviewed the left breast mammogram report and images from December 10, 2024.  IMPRESSION:  1. Status post right mastectomy.  2. No malignancy left breast  BI-RADS Category:  2 Benign.  Recommendation:  Annual Screening.  Recommended Date:  1 Year.  Laterality:  Left.    Assessment/Plan   Diagnoses and all orders for this visit:  History of right breast cancer    No evidence of breast cancer recurrence on examination.  Left breast mammogram benign.  Recommend:  Breast self-exam monthly.  Repeat left mammogram in 1 year.  The patient wishes to continue every 6-month examinations due to her history of 2 episodes of right breast " cancer.  Patient given prescription for 12 mastectomy bras and 1 prosthesis.    Juwan Ochoa MD

## 2024-12-23 ENCOUNTER — APPOINTMENT (OUTPATIENT)
Dept: SURGERY | Facility: CLINIC | Age: 74
End: 2024-12-23
Payer: MEDICARE

## 2024-12-23 VITALS
WEIGHT: 151.2 LBS | BODY MASS INDEX: 25.81 KG/M2 | DIASTOLIC BLOOD PRESSURE: 75 MMHG | RESPIRATION RATE: 16 BRPM | HEIGHT: 64 IN | SYSTOLIC BLOOD PRESSURE: 132 MMHG | TEMPERATURE: 97.3 F | HEART RATE: 67 BPM | OXYGEN SATURATION: 96 %

## 2024-12-23 DIAGNOSIS — Z85.3 HISTORY OF RIGHT BREAST CANCER: Primary | ICD-10-CM

## 2024-12-23 PROCEDURE — 3075F SYST BP GE 130 - 139MM HG: CPT | Performed by: SURGERY

## 2024-12-23 PROCEDURE — 1036F TOBACCO NON-USER: CPT | Performed by: SURGERY

## 2024-12-23 PROCEDURE — 1157F ADVNC CARE PLAN IN RCRD: CPT | Performed by: SURGERY

## 2024-12-23 PROCEDURE — 1159F MED LIST DOCD IN RCRD: CPT | Performed by: SURGERY

## 2024-12-23 PROCEDURE — 3078F DIAST BP <80 MM HG: CPT | Performed by: SURGERY

## 2024-12-23 PROCEDURE — 99213 OFFICE O/P EST LOW 20 MIN: CPT | Performed by: SURGERY

## 2024-12-23 PROCEDURE — 1126F AMNT PAIN NOTED NONE PRSNT: CPT | Performed by: SURGERY

## 2024-12-23 PROCEDURE — 3008F BODY MASS INDEX DOCD: CPT | Performed by: SURGERY

## 2024-12-23 PROCEDURE — 1123F ACP DISCUSS/DSCN MKR DOCD: CPT | Performed by: SURGERY

## 2024-12-23 RX ORDER — CLINDAMYCIN PHOSPHATE 11.9 MG/ML
SOLUTION TOPICAL
COMMUNITY
Start: 2024-07-17

## 2024-12-23 ASSESSMENT — PAIN SCALES - GENERAL: PAINLEVEL_OUTOF10: 0-NO PAIN

## 2024-12-23 NOTE — LETTER
December 23, 2024     Liyah Valenzuela MD  [address] [facility] [address]    Patient: Anna Marie Bryant   YOB: 1950   Date of Visit: 12/23/2024       Dear Dr. Liyah Valenzuela MD:    Thank you for referring Anna Marie Bryant to me for evaluation. Below are my notes for this consultation.  If you have questions, please do not hesitate to call me. I look forward to following your patient along with you.       Sincerely,     Juwan Ochoa MD      CC: No Recipients  ______________________________________________________________________________________    History Of Present Illness  HPI     The patient is a 74-year-old female who had a right breast lumpectomy and axillary lymph node dissection in 1985 followed by radiation therapy for breast cancer.  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  She no longer follows with oncology.    The patient has no breast or chest lumps.  No pain.  No nipple discharge or retraction.    Family history: Maternal great aunt had breast cancer.     Past medical history:  Laparoscopic bilateral oophorectomy  Hypertension    Past Medical History  She has a past medical history of Personal history of malignant neoplasm of breast (2013).    Surgical History  She has a past surgical history that includes Appendectomy (12/23/2015); Lymph node biopsy (12/23/2015); Cataract extraction (12/23/2015); Tonsillectomy (12/23/2015); Other surgical history (10/18/2022); Other surgical history (12/09/2022); Breast surgery (07/01/2020); Other surgical history (10/13/2021); Other surgical history (10/13/2021); and Mastectomy (Right, 2013).     Allergies  Amoxicillin, Erythromycin, Duloxetine, Paroxetine, Sulfamethoxazole, and Trimethoprim    Social History  She reports that she quit smoking about 40 years ago. Her smoking use included cigarettes. She started smoking about 55 years ago. She has a  "7.5 pack-year smoking history. She has never used smokeless tobacco. She reports current alcohol use. She reports that she does not use drugs.    Family History  Family History   Problem Relation Name Age of Onset   • Hypertension Mother     • Other (BLADDER CANCER) Mother     • Other (CARDIAC DISORDER) Father     • Hypertension Father     • Hypertension Brother     • Stroke Maternal Grandmother     • Stroke Maternal Grandfather     • Stroke Paternal Grandmother     • Stroke Paternal Grandfather     • Breast cancer Mother's Sister  30 - 39       Last Recorded Vitals  Blood pressure 132/75, pulse 67, temperature 36.3 °C (97.3 °F), temperature source Temporal, resp. rate 16, height 1.626 m (5' 4\"), weight 68.6 kg (151 lb 3.2 oz), SpO2 96%.    Physical Exam  Constitutional: Well-developed, well-nourished, alert and oriented, no acute distress  Skin: Warm and dry, no lesions, no rashes, no jaundice  HEENT: Normocephalic, atraumatic, EOMI, no scleral icterus, mucous membranes moist, no lesions seen  Neck: Soft, nontender, no mass or adenopathy  Cardiac: Regular rate and rhythm, no murmur  Chest: Patent airway, clear to auscultation, normal breath sounds with good chest expansion, no wheezes or rales or rhonchi noted, thorax symmetric  Breast:     right breast: Status post right mastectomy, nontender, no mass    left breast: No skin changes, nontender, no mass, mild fibrocystic change  Abdomen: Nondistended, soft, nontender, no mass  Rectal: Not performed  Extremities: No injury, no lower extremity edema or calf tenderness  Lymphatic: No cervical or axillary adenopathy  Musculoskeletal: Range of motion intact, no joint swelling, normal strength  Neurological: Alert and oriented x3, intact sensory and motor function, no obvious focal neurologic abnormalities  Psychological: Appropriate mood and behavior  Examination chaperoned by Natali    Relevant Results  I reviewed the left breast mammogram report and images from " December 10, 2024.  IMPRESSION:  1. Status post right mastectomy.  2. No malignancy left breast  BI-RADS Category:  2 Benign.  Recommendation:  Annual Screening.  Recommended Date:  1 Year.  Laterality:  Left.    Assessment/Plan  Diagnoses and all orders for this visit:  History of right breast cancer    No evidence of breast cancer recurrence on examination.  Left breast mammogram benign.  Recommend:  Breast self-exam monthly.  Repeat left mammogram in 1 year.  The patient wishes to continue every 6-month examinations due to her history of 2 episodes of right breast cancer.  Patient given prescription for 12 mastectomy bras and 1 prosthesis.    Juwan Ochoa MD

## 2025-01-03 DIAGNOSIS — E78.5 HYPERLIPIDEMIA, UNSPECIFIED HYPERLIPIDEMIA TYPE: ICD-10-CM

## 2025-01-03 RX ORDER — ROSUVASTATIN CALCIUM 10 MG/1
10 TABLET, COATED ORAL DAILY
Qty: 90 TABLET | Refills: 3 | Status: SHIPPED | OUTPATIENT
Start: 2025-01-03

## 2025-01-07 DIAGNOSIS — I10 HYPERTENSION, UNSPECIFIED TYPE: ICD-10-CM

## 2025-01-07 RX ORDER — AMLODIPINE BESYLATE 5 MG/1
5 TABLET ORAL DAILY
Qty: 90 TABLET | Refills: 3 | Status: SHIPPED | OUTPATIENT
Start: 2025-01-07

## 2025-04-30 LAB
ALBUMIN SERPL-MCNC: 4.5 G/DL (ref 3.6–5.1)
ALP SERPL-CCNC: 59 U/L (ref 37–153)
ALT SERPL-CCNC: 9 U/L (ref 6–29)
ANION GAP SERPL CALCULATED.4IONS-SCNC: 9 MMOL/L (CALC) (ref 7–17)
AST SERPL-CCNC: 15 U/L (ref 10–35)
BASOPHILS # BLD AUTO: 41 CELLS/UL (ref 0–200)
BASOPHILS NFR BLD AUTO: 0.8 %
BILIRUB SERPL-MCNC: 0.7 MG/DL (ref 0.2–1.2)
BUN SERPL-MCNC: 18 MG/DL (ref 7–25)
CALCIUM SERPL-MCNC: 9.4 MG/DL (ref 8.6–10.4)
CHLORIDE SERPL-SCNC: 102 MMOL/L (ref 98–110)
CHOLEST SERPL-MCNC: 169 MG/DL
CHOLEST/HDLC SERPL: 2.4 (CALC)
CO2 SERPL-SCNC: 30 MMOL/L (ref 20–32)
CREAT SERPL-MCNC: 0.93 MG/DL (ref 0.6–1)
EGFRCR SERPLBLD CKD-EPI 2021: 64 ML/MIN/1.73M2
EOSINOPHIL # BLD AUTO: 301 CELLS/UL (ref 15–500)
EOSINOPHIL NFR BLD AUTO: 5.9 %
ERYTHROCYTE [DISTWIDTH] IN BLOOD BY AUTOMATED COUNT: 13.4 % (ref 11–15)
GLUCOSE SERPL-MCNC: 107 MG/DL (ref 65–99)
HCT VFR BLD AUTO: 42.3 % (ref 35–45)
HDLC SERPL-MCNC: 71 MG/DL
HGB BLD-MCNC: 14.2 G/DL (ref 11.7–15.5)
LDLC SERPL CALC-MCNC: 83 MG/DL (CALC)
LYMPHOCYTES # BLD AUTO: 1204 CELLS/UL (ref 850–3900)
LYMPHOCYTES NFR BLD AUTO: 23.6 %
MCH RBC QN AUTO: 30.5 PG (ref 27–33)
MCHC RBC AUTO-ENTMCNC: 33.6 G/DL (ref 32–36)
MCV RBC AUTO: 90.8 FL (ref 80–100)
MONOCYTES # BLD AUTO: 587 CELLS/UL (ref 200–950)
MONOCYTES NFR BLD AUTO: 11.5 %
NEUTROPHILS # BLD AUTO: 2968 CELLS/UL (ref 1500–7800)
NEUTROPHILS NFR BLD AUTO: 58.2 %
NONHDLC SERPL-MCNC: 98 MG/DL (CALC)
PLATELET # BLD AUTO: 248 THOUSAND/UL (ref 140–400)
PMV BLD REES-ECKER: 10.2 FL (ref 7.5–12.5)
POTASSIUM SERPL-SCNC: 4.4 MMOL/L (ref 3.5–5.3)
PROT SERPL-MCNC: 6.7 G/DL (ref 6.1–8.1)
RBC # BLD AUTO: 4.66 MILLION/UL (ref 3.8–5.1)
SODIUM SERPL-SCNC: 141 MMOL/L (ref 135–146)
TRIGL SERPL-MCNC: 70 MG/DL
WBC # BLD AUTO: 5.1 THOUSAND/UL (ref 3.8–10.8)

## 2025-05-14 ENCOUNTER — APPOINTMENT (OUTPATIENT)
Dept: RADIOLOGY | Facility: CLINIC | Age: 75
End: 2025-05-14
Payer: MEDICARE

## 2025-05-15 ENCOUNTER — APPOINTMENT (OUTPATIENT)
Dept: PRIMARY CARE | Facility: CLINIC | Age: 75
End: 2025-05-15
Payer: MEDICARE

## 2025-05-15 VITALS
SYSTOLIC BLOOD PRESSURE: 140 MMHG | HEIGHT: 64 IN | TEMPERATURE: 98.6 F | OXYGEN SATURATION: 95 % | HEART RATE: 73 BPM | WEIGHT: 148.8 LBS | DIASTOLIC BLOOD PRESSURE: 78 MMHG | BODY MASS INDEX: 25.4 KG/M2

## 2025-05-15 DIAGNOSIS — R05.9 COUGH, UNSPECIFIED TYPE: ICD-10-CM

## 2025-05-15 DIAGNOSIS — E03.9 HYPOTHYROIDISM, UNSPECIFIED TYPE: ICD-10-CM

## 2025-05-15 DIAGNOSIS — J40 BRONCHITIS: ICD-10-CM

## 2025-05-15 DIAGNOSIS — C50.811 MALIGNANT NEOPLASM OF OVERLAPPING SITES OF RIGHT FEMALE BREAST, UNSPECIFIED ESTROGEN RECEPTOR STATUS: ICD-10-CM

## 2025-05-15 DIAGNOSIS — Z00.00 ROUTINE GENERAL MEDICAL EXAMINATION AT HEALTH CARE FACILITY: Primary | ICD-10-CM

## 2025-05-15 DIAGNOSIS — R73.9 HYPERGLYCEMIA: ICD-10-CM

## 2025-05-15 PROCEDURE — 1036F TOBACCO NON-USER: CPT | Performed by: INTERNAL MEDICINE

## 2025-05-15 PROCEDURE — 3078F DIAST BP <80 MM HG: CPT | Performed by: INTERNAL MEDICINE

## 2025-05-15 PROCEDURE — 1159F MED LIST DOCD IN RCRD: CPT | Performed by: INTERNAL MEDICINE

## 2025-05-15 PROCEDURE — 1123F ACP DISCUSS/DSCN MKR DOCD: CPT | Performed by: INTERNAL MEDICINE

## 2025-05-15 PROCEDURE — 99213 OFFICE O/P EST LOW 20 MIN: CPT | Performed by: INTERNAL MEDICINE

## 2025-05-15 PROCEDURE — 1157F ADVNC CARE PLAN IN RCRD: CPT | Performed by: INTERNAL MEDICINE

## 2025-05-15 PROCEDURE — G0439 PPPS, SUBSEQ VISIT: HCPCS | Performed by: INTERNAL MEDICINE

## 2025-05-15 PROCEDURE — 1170F FXNL STATUS ASSESSED: CPT | Performed by: INTERNAL MEDICINE

## 2025-05-15 PROCEDURE — 1158F ADVNC CARE PLAN TLK DOCD: CPT | Performed by: INTERNAL MEDICINE

## 2025-05-15 PROCEDURE — 3077F SYST BP >= 140 MM HG: CPT | Performed by: INTERNAL MEDICINE

## 2025-05-15 PROCEDURE — 1126F AMNT PAIN NOTED NONE PRSNT: CPT | Performed by: INTERNAL MEDICINE

## 2025-05-15 PROCEDURE — 3008F BODY MASS INDEX DOCD: CPT | Performed by: INTERNAL MEDICINE

## 2025-05-15 RX ORDER — DOXYCYCLINE 100 MG/1
100 CAPSULE ORAL 2 TIMES DAILY
Qty: 14 CAPSULE | Refills: 0 | Status: SHIPPED | OUTPATIENT
Start: 2025-05-15 | End: 2025-05-22

## 2025-05-15 RX ORDER — CHOLECALCIFEROL (VITAMIN D3) 50 MCG
50 TABLET ORAL DAILY
COMMUNITY

## 2025-05-15 RX ORDER — ALBUTEROL SULFATE 90 UG/1
2 INHALANT RESPIRATORY (INHALATION) EVERY 4 HOURS PRN
Qty: 8.5 G | Refills: 0 | Status: SHIPPED | OUTPATIENT
Start: 2025-05-15 | End: 2026-05-15

## 2025-05-15 RX ORDER — BENZONATATE 200 MG/1
200 CAPSULE ORAL 3 TIMES DAILY PRN
Qty: 30 CAPSULE | Refills: 0 | Status: SHIPPED | OUTPATIENT
Start: 2025-05-15

## 2025-05-15 ASSESSMENT — LIFESTYLE VARIABLES
HOW OFTEN DO YOU HAVE A DRINK CONTAINING ALCOHOL: NEVER
HOW MANY STANDARD DRINKS CONTAINING ALCOHOL DO YOU HAVE ON A TYPICAL DAY: PATIENT DOES NOT DRINK
HOW OFTEN DO YOU HAVE SIX OR MORE DRINKS ON ONE OCCASION: NEVER
SKIP TO QUESTIONS 9-10: 1
AUDIT-C TOTAL SCORE: 0

## 2025-05-15 ASSESSMENT — PAIN SCALES - GENERAL: PAINLEVEL_OUTOF10: 0-NO PAIN

## 2025-05-15 ASSESSMENT — ACTIVITIES OF DAILY LIVING (ADL)
TAKING_MEDICATION: INDEPENDENT
MANAGING_FINANCES: INDEPENDENT
DRESSING: INDEPENDENT
DOING_HOUSEWORK: INDEPENDENT
BATHING: INDEPENDENT
GROCERY_SHOPPING: INDEPENDENT

## 2025-05-15 ASSESSMENT — PATIENT HEALTH QUESTIONNAIRE - PHQ9
SUM OF ALL RESPONSES TO PHQ9 QUESTIONS 1 AND 2: 0
1. LITTLE INTEREST OR PLEASURE IN DOING THINGS: NOT AT ALL
2. FEELING DOWN, DEPRESSED OR HOPELESS: NOT AT ALL

## 2025-05-15 NOTE — PROGRESS NOTES
"Chief Complaint   Patient presents with    Medicare Annual Wellness Visit Subsequent     Pt here for AWV, states \"I also have a cough and congestion and diarrhea.\"  Onset 2 weeks       75 y.o. for AWV  Last visit 11/2024.   present.  Patient has been sick for about 2 weeks.  It started as allergies.  They indicate that this happens typically every year at about this time.  Initially allergy symptoms and sinus congestion now has a lot of cough with yellow sputum.  Diarrhea for 3 days.  She following as per day.  They are soft no blood.  Denies sore throat.  Using Mucinex.  Does have wheezing.  Symptoms worse at night.  Able to control diarrhea with over-the-counter medications.  Food intake is not being good for the last 6 months.  Her brother had gangrene.  She is his only living relative and so she was going to the hospital in rehab.  She usually cooks for herself but was not doing so because of trying to take care of all of him and the house.  He is now in an assisted living facility so she thinks things will be better from that standpoint.  She has a lot of support through her S[employer]ism.     Past Medical History  Right breast cancer 1985 age 35 lumpectomy and radiation treatment.   Breast cancer diagnosed 2013, right mastectomy, Dr Ochoa [facility].   Hypothyroidism  Hyperlipidemia  HTN  Anxiety  B12 deficiency  Remote pneumonia  Leukopenia, Dr Antunez (patient indicates stable and did not need follow up)  Oophorectomy 1976, ovarian cyst ruptured.      Social History  Former smoker quit age 31  . no children, active in social groups    Blood pressure 140/78, pulse 73, temperature 37 °C (98.6 °F), height 1.613 m (5' 3.5\"), weight 67.5 kg (148 lb 12.8 oz), SpO2 95%.  Body mass index is 25.95 kg/m².    Physical Examination  General: NAD. Alert.  Mask removed for exam  Sinuses: No focal tenderness to palpation or percussion HEENT: Normocephalic atraumatic.    Eyes: no scleral icterus. Extraocular " "movements intact.  Pupils equal round and reactive to light.  Ears: TM intact.  No cerumen. Hearing grossly intact.   Throat: No exudate  Neck:  Supple. No thyroid goiter.  Lymph nodes: No cervical or clavicular adenopathy  Cardiovascular: Regular rate rhythm S1-S2 normal no murmur. No carotid bruit.   Lungs: Clear to Auscultation without wheezing, rales, or rhonchi, Breath sounds symmetric. No use of accessory muscles  Abdomen:  Normoactive bowel sounds, soft, non-tender. No mass.  No organomegaly  Extremities: No pretibial edema  Neuro: no facial asymmetry. Strength upper and lower extremities 5/5. Sensation intact to light touch. No tremor. Babinski negative  Skin: no rash noted  Vascular: DP pulses intact.  Easily palpated, symmetric.  Feet without cyanosis.  Breast: Declined chaperone.  Right breast surgically absent.  Scar right chest and right axilla.  Left breast no rash, no mass, no nipple discharge, no axillary adenopathy    Labs 04/2025 cbc, cmp, lipid.     No results found for: \"HGBA1C\"  Lab Results   Component Value Date    GLUCOSE 107 (H) 04/29/2025    CALCIUM 9.4 04/29/2025     04/29/2025    K 4.4 04/29/2025    CO2 30 04/29/2025     04/29/2025    BUN 18 04/29/2025    CREATININE 0.93 04/29/2025     Lab Results   Component Value Date    ALT 9 04/29/2025    AST 15 04/29/2025    ALKPHOS 59 04/29/2025    BILITOT 0.7 04/29/2025     Lab Results   Component Value Date    WBC 5.1 04/29/2025    HGB 14.2 04/29/2025    HCT 42.3 04/29/2025    MCV 90.8 04/29/2025     04/29/2025     Lab Results   Component Value Date    CHOL 169 04/29/2025    CHOL 131 10/06/2022     Lab Results   Component Value Date    HDL 71 04/29/2025    HDL 58.7 10/06/2022     Lab Results   Component Value Date    LDLCALC 83 04/29/2025     Lab Results   Component Value Date    TRIG 70 04/29/2025    TRIG 79 10/06/2022     No components found for: \"CHOLHDL\"  10/2024 hepatitis c screen, tsh, D, b12, lipid, cmp, " cbc      ASSESSMENT/PLAN  AWV  Living Will: has a living will   Cognition/Memory if 65 or above: intact  Hepatitis C screen (18-79) done 10/28/2024  HIV screen(18-64, once)  n/a  Mammogram: hx of right mastectomy Dr Ochoa.  Mammogram 12/2024   Pap: n/a pt over 65  Dexa 04/2025  Colon cancer screening 45 and older: colonoscopy 08/2022 Dr Morrison.  Scope due 2027  Immunizations: recommend shingles vaccine  Depression screen: denies  CT calcium score: declines    If Smoker/Former smoker:    Age 50-75, 20 pack year history, quit within 15 years or currently smoking  (medicare 55-77, 30 pack year)  n/a    1. Hyperglycemia  - Hemoglobin A1C; Future  - Basic Metabolic Panel; Future    2. Routine general medical examination at health care facility (Primary)  - 1 Year Follow Up In Primary Care - Wellness Exam; Future    3. Cough, unspecified type  Declines covid testing.   4. Bronchitis  - benzonatate (Tessalon) 200 mg capsule; Take 1 capsule (200 mg) by mouth 3 times a day as needed for cough. Do not crush or chew.  Dispense: 30 capsule; Refill: 0  - doxycycline (Vibramycin) 100 mg capsule; Take 1 capsule (100 mg) by mouth 2 times a day for 7 days. Take with at least 8 ounces (large glass) of water, do not lie down for 30 minutes after  Dispense: 14 capsule; Refill: 0  - dextromethorphan-guaifenesin (Mucinex DM)  mg 12 hr tablet; Take 1 tablet by mouth every 12 hours for 10 days. Do not crush, chew, or split.  Dispense: 20 tablet; Refill: 0  - albuterol (ProAir HFA) 90 mcg/actuation inhaler; Inhale 2 puffs every 4 hours if needed for wheezing or shortness of breath.  Dispense: 8.5 g; Refill: 0    5. Hypothyroidism, unspecified type  Added tsh after office visit noted she will be due in the fall.   - Thyroid Stimulating Hormone; Future      Medications Ordered Prior to Encounter[1]         [1]   Current Outpatient Medications on File Prior to Visit   Medication Sig Dispense Refill    albuterol (ProAir HFA) 90  mcg/actuation inhaler Inhale 2 puffs every 4 hours if needed for wheezing or shortness of breath. 8.5 g 0    amLODIPine (Norvasc) 5 mg tablet Take 1 tablet (5 mg) by mouth once daily. as directed 90 tablet 3    cholecalciferol (Vitamin D3) 50 mcg (2,000 units) tablet Take 1 tablet (50 mcg) by mouth once daily.      citalopram (CeleXA) 20 mg tablet Take 1 tablet (20 mg) by mouth once daily. 90 tablet 3    cyanocobalamin, vitamin B-12, (Vitamin B-12) 1,000 mcg tablet extended release Take 1 tablet (1,000 mcg) by mouth once daily.      levothyroxine (Synthroid, Levoxyl) 88 mcg tablet Take 1 tablet (88 mcg) by mouth once daily. 90 tablet 3    metoprolol succinate XL (Toprol-XL) 100 mg 24 hr tablet Take 1 tablet (100 mg) by mouth once daily. 90 tablet 3    rosuvastatin (Crestor) 10 mg tablet TAKE 1 TABLET(10 MG) BY MOUTH EVERY DAY 90 tablet 3    clindamycin (Cleocin T) 1 % external solution APPLY TO AFFECTED AREA OF SCALP TWICE DAILY FOR 2 WEEKS (Patient not taking: Reported on 5/15/2025)       No current facility-administered medications on file prior to visit.

## 2025-06-17 NOTE — PROGRESS NOTES
"History Of Present Illness  HPI    The patient is a 75-year-old female who had a right breast lumpectomy and axillary lymph node dissection in 1985 followed by radiation therapy for breast cancer.  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  She no longer follows with oncology.  No complaints of breast or chest lumps.  No pain.  No nipple discharge or retraction.     Family history: Maternal great aunt had breast cancer.     Past medical history:  Laparoscopic bilateral oophorectomy  Hypertension    Past Medical History  She has a past medical history of Personal history of malignant neoplasm of breast (2013).    Surgical History  She has a past surgical history that includes Appendectomy (12/23/2015); Lymph node biopsy (12/23/2015); Cataract extraction (12/23/2015); Tonsillectomy (12/23/2015); Other surgical history (10/18/2022); Other surgical history (12/09/2022); Breast surgery (07/01/2020); Other surgical history (10/13/2021); Other surgical history (10/13/2021); and Mastectomy (Right, 2013).     Allergies  Amoxicillin, Erythromycin, Duloxetine, Paroxetine, Sulfamethoxazole, and Trimethoprim    Social History  She reports that she quit smoking about 40 years ago. Her smoking use included cigarettes. She started smoking about 55 years ago. She has a 7.5 pack-year smoking history. She has never used smokeless tobacco. She reports current alcohol use. She reports that she does not use drugs.    Family History  Family History[1]    Last Recorded Vitals  Blood pressure 141/76, pulse 61, temperature 36.1 °C (96.9 °F), temperature source Temporal, resp. rate 16, height 1.626 m (5' 4\"), weight 67.2 kg (148 lb 3.2 oz), SpO2 95%.    Physical Exam   Constitutional: Well-developed, well-nourished, alert and oriented, no acute distress  Skin: Warm and dry, no lesions, no rashes, no jaundice  HEENT: Normocephalic, atraumatic, " EOMI, no scleral icterus, mucous membranes moist, no lesions seen  Neck: Soft, nontender, no mass or adenopathy  Cardiac: Regular rate and rhythm, no murmur  Chest: Patent airway, clear to auscultation, normal breath sounds with good chest expansion, no wheezes or rales or rhonchi noted, thorax symmetric  Breast:     right breast: Status post right mastectomy, nontender, no mass    left breast: No skin changes, nontender, no mass, mild fibrocystic change  Abdomen: Nondistended, soft, nontender, no mass  Rectal: Not performed  Extremities: No injury, no lower extremity edema or calf tenderness  Lymphatic: No cervical or axillary adenopathy  Musculoskeletal: Range of motion intact, no joint swelling, normal strength  Neurological: Alert and oriented x3, intact sensory and motor function, no obvious focal neurologic abnormalities  Psychological: Appropriate mood and behavior  Examination chaperoned by Maisha Shaffer    Relevant Results    Assessment/Plan   Diagnoses and all orders for this visit:  History of right breast cancer  Screening mammogram for breast cancer  -     BI mammo left screening tomosynthesis; Future    75-year-old with history of right breast cancer.  No evidence of breast cancer recurrence on examination.  Left breast mammogram benign on December 10, 2024.  Recommend:  Breast self-exam monthly.  Repeat left mammogram December 11, 2025  The patient wishes to continue every 6-month examinations due to her history of 2 episodes of right breast cancer.  Patient given prescription for 12 mastectomy bras and 1 prosthesis at appointment last December.    Juwan Ochoa MD         [1]   Family History  Problem Relation Name Age of Onset    Hypertension Mother      Other (BLADDER CANCER) Mother      Other (CARDIAC DISORDER) Father      Hypertension Father      Hypertension Brother      Stroke Maternal Grandmother      Stroke Maternal Grandfather      Stroke Paternal Grandmother      Stroke Paternal Grandfather       Breast cancer Mother's Sister  30 - 39

## 2025-06-18 ENCOUNTER — APPOINTMENT (OUTPATIENT)
Dept: SURGERY | Facility: CLINIC | Age: 75
End: 2025-06-18
Payer: MEDICARE

## 2025-06-18 VITALS
HEIGHT: 64 IN | RESPIRATION RATE: 16 BRPM | BODY MASS INDEX: 25.3 KG/M2 | HEART RATE: 61 BPM | TEMPERATURE: 96.9 F | SYSTOLIC BLOOD PRESSURE: 141 MMHG | WEIGHT: 148.2 LBS | OXYGEN SATURATION: 95 % | DIASTOLIC BLOOD PRESSURE: 76 MMHG

## 2025-06-18 DIAGNOSIS — Z85.3 HISTORY OF RIGHT BREAST CANCER: Primary | ICD-10-CM

## 2025-06-18 DIAGNOSIS — Z12.31 SCREENING MAMMOGRAM FOR BREAST CANCER: ICD-10-CM

## 2025-06-18 PROCEDURE — 1126F AMNT PAIN NOTED NONE PRSNT: CPT | Performed by: SURGERY

## 2025-06-18 PROCEDURE — 1159F MED LIST DOCD IN RCRD: CPT | Performed by: SURGERY

## 2025-06-18 PROCEDURE — 3077F SYST BP >= 140 MM HG: CPT | Performed by: SURGERY

## 2025-06-18 PROCEDURE — 3078F DIAST BP <80 MM HG: CPT | Performed by: SURGERY

## 2025-06-18 PROCEDURE — 1036F TOBACCO NON-USER: CPT | Performed by: SURGERY

## 2025-06-18 PROCEDURE — 99213 OFFICE O/P EST LOW 20 MIN: CPT | Performed by: SURGERY

## 2025-06-18 PROCEDURE — 3008F BODY MASS INDEX DOCD: CPT | Performed by: SURGERY

## 2025-06-18 ASSESSMENT — PAIN SCALES - GENERAL: PAINLEVEL_OUTOF10: 0-NO PAIN

## 2025-06-18 NOTE — LETTER
June 18, 2025     Liyah Valenzuela MD  [address] [facility] [address]    Patient: Anna Marie Bryant   YOB: 1950   Date of Visit: 6/18/2025       Dear Dr. Liyah Valenzuela MD:    Thank you for referring Anna Marie Bryant to me for evaluation. Below are my notes for this consultation.  If you have questions, please do not hesitate to call me. I look forward to following your patient along with you.       Sincerely,     Juwan Ochoa MD      CC: No Recipients  ______________________________________________________________________________________    History Of Present Illness  HPI    The patient is a 75-year-old female who had a right breast lumpectomy and axillary lymph node dissection in 1985 followed by radiation therapy for breast cancer.  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  She no longer follows with oncology.  No complaints of breast or chest lumps.  No pain.  No nipple discharge or retraction.     Family history: Maternal great aunt had breast cancer.     Past medical history:  Laparoscopic bilateral oophorectomy  Hypertension    Past Medical History  She has a past medical history of Personal history of malignant neoplasm of breast (2013).    Surgical History  She has a past surgical history that includes Appendectomy (12/23/2015); Lymph node biopsy (12/23/2015); Cataract extraction (12/23/2015); Tonsillectomy (12/23/2015); Other surgical history (10/18/2022); Other surgical history (12/09/2022); Breast surgery (07/01/2020); Other surgical history (10/13/2021); Other surgical history (10/13/2021); and Mastectomy (Right, 2013).     Allergies  Amoxicillin, Erythromycin, Duloxetine, Paroxetine, Sulfamethoxazole, and Trimethoprim    Social History  She reports that she quit smoking about 40 years ago. Her smoking use included cigarettes. She started smoking about 55 years ago. She has a 7.5  "pack-year smoking history. She has never used smokeless tobacco. She reports current alcohol use. She reports that she does not use drugs.    Family History  Family History[1]    Last Recorded Vitals  Blood pressure 141/76, pulse 61, temperature 36.1 °C (96.9 °F), temperature source Temporal, resp. rate 16, height 1.626 m (5' 4\"), weight 67.2 kg (148 lb 3.2 oz), SpO2 95%.    Physical Exam   Constitutional: Well-developed, well-nourished, alert and oriented, no acute distress  Skin: Warm and dry, no lesions, no rashes, no jaundice  HEENT: Normocephalic, atraumatic, EOMI, no scleral icterus, mucous membranes moist, no lesions seen  Neck: Soft, nontender, no mass or adenopathy  Cardiac: Regular rate and rhythm, no murmur  Chest: Patent airway, clear to auscultation, normal breath sounds with good chest expansion, no wheezes or rales or rhonchi noted, thorax symmetric  Breast:     right breast: Status post right mastectomy, nontender, no mass    left breast: No skin changes, nontender, no mass, mild fibrocystic change  Abdomen: Nondistended, soft, nontender, no mass  Rectal: Not performed  Extremities: No injury, no lower extremity edema or calf tenderness  Lymphatic: No cervical or axillary adenopathy  Musculoskeletal: Range of motion intact, no joint swelling, normal strength  Neurological: Alert and oriented x3, intact sensory and motor function, no obvious focal neurologic abnormalities  Psychological: Appropriate mood and behavior  Examination chaperoned by Maisha Shaffer    Relevant Results    Assessment/Plan  Diagnoses and all orders for this visit:  History of right breast cancer  Screening mammogram for breast cancer  -     BI mammo left screening tomosynthesis; Future    75-year-old with history of right breast cancer.  No evidence of breast cancer recurrence on examination.  Left breast mammogram benign on December 10, 2024.  Recommend:  Breast self-exam monthly.  Repeat left mammogram December 11, 2025  The " patient wishes to continue every 6-month examinations due to her history of 2 episodes of right breast cancer.  Patient given prescription for 12 mastectomy bras and 1 prosthesis at appointment last December.    Juwan Ochoa MD         [1]  Family History  Problem Relation Name Age of Onset   • Hypertension Mother     • Other (BLADDER CANCER) Mother     • Other (CARDIAC DISORDER) Father     • Hypertension Father     • Hypertension Brother     • Stroke Maternal Grandmother     • Stroke Maternal Grandfather     • Stroke Paternal Grandmother     • Stroke Paternal Grandfather     • Breast cancer Mother's Sister  30 - 39        [1]  Family History  Problem Relation Name Age of Onset   • Hypertension Mother     • Other (BLADDER CANCER) Mother     • Other (CARDIAC DISORDER) Father     • Hypertension Father     • Hypertension Brother     • Stroke Maternal Grandmother     • Stroke Maternal Grandfather     • Stroke Paternal Grandmother     • Stroke Paternal Grandfather     • Breast cancer Mother's Sister  30 - 39

## 2025-11-12 ENCOUNTER — APPOINTMENT (OUTPATIENT)
Dept: PRIMARY CARE | Facility: CLINIC | Age: 75
End: 2025-11-12
Payer: MEDICARE

## (undated) DEVICE — Device

## (undated) DEVICE — DRESSING, TELFA, 3X4

## (undated) DEVICE — DRESSING, TRANSPARENT, TEGADERM, 2-3/8 X 2-3/4 IN